# Patient Record
Sex: FEMALE | Race: WHITE | NOT HISPANIC OR LATINO | Employment: FULL TIME | URBAN - METROPOLITAN AREA
[De-identification: names, ages, dates, MRNs, and addresses within clinical notes are randomized per-mention and may not be internally consistent; named-entity substitution may affect disease eponyms.]

---

## 2024-01-16 ENCOUNTER — APPOINTMENT (OUTPATIENT)
Dept: AUDIOLOGY | Facility: CLINIC | Age: 29
End: 2024-01-16
Payer: COMMERCIAL

## 2024-01-22 DIAGNOSIS — M79.642 HAND PAIN, LEFT: Primary | ICD-10-CM

## 2024-01-23 ENCOUNTER — APPOINTMENT (OUTPATIENT)
Dept: AUDIOLOGY | Facility: CLINIC | Age: 29
End: 2024-01-23
Payer: COMMERCIAL

## 2024-01-23 ENCOUNTER — HOSPITAL ENCOUNTER (OUTPATIENT)
Dept: RADIOLOGY | Facility: CLINIC | Age: 29
Discharge: HOME | End: 2024-01-23
Payer: COMMERCIAL

## 2024-01-23 ENCOUNTER — OFFICE VISIT (OUTPATIENT)
Dept: ORTHOPEDIC SURGERY | Facility: CLINIC | Age: 29
End: 2024-01-23
Payer: COMMERCIAL

## 2024-01-23 ENCOUNTER — CLINICAL SUPPORT (OUTPATIENT)
Dept: AUDIOLOGY | Facility: CLINIC | Age: 29
End: 2024-01-23
Payer: COMMERCIAL

## 2024-01-23 VITALS — BODY MASS INDEX: 26.05 KG/M2 | WEIGHT: 147 LBS | HEIGHT: 63 IN

## 2024-01-23 DIAGNOSIS — M79.642 HAND PAIN, LEFT: ICD-10-CM

## 2024-01-23 DIAGNOSIS — H93.11 TINNITUS OF RIGHT EAR: ICD-10-CM

## 2024-01-23 DIAGNOSIS — Z02.6 ENCOUNTER RELATED TO WORKER'S COMPENSATION CLAIM: Primary | ICD-10-CM

## 2024-01-23 DIAGNOSIS — S60.222A: ICD-10-CM

## 2024-01-23 DIAGNOSIS — H90.11 CONDUCTIVE HEARING LOSS OF RIGHT EAR WITH UNRESTRICTED HEARING OF LEFT EAR: Primary | ICD-10-CM

## 2024-01-23 PROCEDURE — 73130 X-RAY EXAM OF HAND: CPT | Mod: LT

## 2024-01-23 PROCEDURE — 73130 X-RAY EXAM OF HAND: CPT | Mod: LEFT SIDE | Performed by: RADIOLOGY

## 2024-01-23 PROCEDURE — 99203 OFFICE O/P NEW LOW 30 MIN: CPT | Performed by: ORTHOPAEDIC SURGERY

## 2024-01-23 ASSESSMENT — PAIN DESCRIPTION - DESCRIPTORS: DESCRIPTORS: ACHING

## 2024-01-23 ASSESSMENT — PAIN - FUNCTIONAL ASSESSMENT: PAIN_FUNCTIONAL_ASSESSMENT: 0-10

## 2024-01-23 ASSESSMENT — PAIN SCALES - GENERAL: PAINLEVEL_OUTOF10: 5 - MODERATE PAIN
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
## 2024-01-23 NOTE — PROGRESS NOTES
HISTORY:  Harriet Washington, 28 yrs., was here for an annual audiologic assessment. 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 She reports her most recent surgery was in 2018. She has right conductive hearing loss previously noted. Her most recent audiogram was from 2/11/19. She feels her hearing fluctuates in her right ear and her left ear has remained stable. She notes constant right tinnitus following her most recent surgery. She denies otalgia, otorrea, aural fullness, dizziness, family history of hearing loss, and excessive noise exposure. She wears a hearing aid in the right ear obtained from a private practice in [place]. She is re-establishing care with Dr. Santiago for monitoring of her otologic health.       RESULTS:  Prior to testing both external auditory canals were clear. She has a surgical right ear and unremarkable left ear.     Immittance and acoustic reflexes:  Immittance testing yielded TYPE A tympanograms indicating normal middle ear function right ear  Immittance testing yielded TYPE Ad tympanograms indicating hyper compliant tympanic membrane movement left ear  Acoustic reflexes were absent right ear  Acoustic reflexes were present 500 - 4000 Hz left ear    Audiogram:  Mild sloping to severe conductive hearing loss in the right.   Normal hearing levels were obtained 250 - 8000 Hz in the left ear.  Speech reception thresholds obtained 45 dBHL in the right ear (w/ masking) and 15 dBHL in the left ear.   Speech discrimination scores were 100% at 85 dBHL (w/ masking) in the right ear and 92% at 55 dBHL in the left ear.    Distortion product otoacoustic emissions:  Emissions were absent 2000 - 8000 Hz in the right ear   Robust emissions were obtained 2000 -8000 Hz  in the left ear    IMPRESSIONS:  Normal middle ear function noted in the right ear and hyper compliant tympanic membrane movement in the left ear.  Normal acoustic reflexes in the left ear and  absent reflexes in the right ear.  Robust distortion product otoacoustic emissions indicate normal cochlear function in the left ear.   Mild sloping to severe conductive hearing loss in the right ear and normal hearing in the left ear.     RECOMMENDATIONS:  1.  Follow up with referring provider  2.  Retest hearing levels annually  3. Continue use of amplification in the right ear      time: 13:50 - 14:45    Report written by Willard Almonte, TANIA-A

## 2024-02-07 ENCOUNTER — APPOINTMENT (OUTPATIENT)
Dept: AUDIOLOGY | Facility: HOSPITAL | Age: 29
End: 2024-02-07
Payer: COMMERCIAL

## 2024-02-07 ENCOUNTER — APPOINTMENT (OUTPATIENT)
Dept: OTOLARYNGOLOGY | Facility: HOSPITAL | Age: 29
End: 2024-02-07
Payer: COMMERCIAL

## 2024-02-14 ENCOUNTER — HOSPITAL ENCOUNTER (OUTPATIENT)
Dept: RADIOLOGY | Facility: CLINIC | Age: 29
Discharge: HOME | End: 2024-02-14
Payer: COMMERCIAL

## 2024-02-14 DIAGNOSIS — Z02.6 ENCOUNTER RELATED TO WORKER'S COMPENSATION CLAIM: ICD-10-CM

## 2024-02-14 PROCEDURE — 73218 MRI UPPER EXTREMITY W/O DYE: CPT | Mod: LEFT SIDE | Performed by: STUDENT IN AN ORGANIZED HEALTH CARE EDUCATION/TRAINING PROGRAM

## 2024-02-14 PROCEDURE — 73218 MRI UPPER EXTREMITY W/O DYE: CPT | Mod: LT

## 2024-02-29 NOTE — PATIENT INSTRUCTIONS
Patient Education:  Cholesteatoma:  A cholesteatoma is a relatively uncommon benign condition that belongs to the category chronic ear condition. It is a “tumor-like” cyst that is formed by introduction of epithelial tissue (skin) into the middle ear space. This cyst can follow the path of least resistance and extend into the middle ear cleft, area above the middle ear (epitympanum) and area behind the middle ear (mastoid). As the condition progresses it causes progressive deterioration of hearing, recurrent infection manifested by drainage, and when complications occur dizziness, facial palsy and rarely intracranial complications. The condition has a high rate of recidivism (recurrence or persistence) given the nature of the pathology and complexity of the ear anatomy. The treatment is usually surgical designed first to achieve a safe ear (that does not have cholesteatoma) which may require more than one surgery. Often a second stage procedure is planned in 6 to 9 months to ensure absence of recurrence. The secondary goal of the surgery is to restore hearing functionality by rebuilding the hearing bones. This requires at times placement of a middle ear prosthesis and is often done at the second stage procedure. Rate of hearing restoration depend on the status of the residual hearing bones and the healing process of the ear. Good hearing restoration ranges from 65 to 90%. In the event hearing is not adequately restore the patient has the option of obtaining hearing aids. The surgery advised is known as tympanoplasty and mastoidectomy. The surgeon may detail the types (canal wall down or intact canal wall up) that he or she feels is most appropriate to treat your condition.  Tympanoplasty  Tympanoplasty or reconstruction of the middle ear hearing mechanism serves the purpose of rebuilding the tympanic membrane and/or middle ear bones. Success depends almost as much on the ability of the body to heal and preserve the  reconstruction as it does on the surgeon's skill.   Adverse effects are uncommon, but loss of sense of taste on the side of the tongue may occur in 10 to 20%. It is usually only a minor inconvenience for a few weeks. Post-operative dizziness, usually limited to few days, is somewhat uncommon after surgery limited to the repair of a tympanic membrane perforation and slightly more common after rebuilding the ear bones. Unless control of infection or concern of cholesteatoma (as skin in the middle ear exists) is the reason for surgery, tympanoplasty is an elective procedure. Use of a hearing aid may be an alternative to reconstructive surgery. If the tympanic membrane perforation is not repaired, ear plugs are recommended to protect the middle ear from contamination when bathing. This may help to prevent infection and its complications. Some underlying chronic condition such as Eustachian tube dilatory dysfunction may affect the long term results after a successful initial surgery. Your physician may discuss this with you during subsequent visits.  Mastoidectomy:  Mastoidectomy is an operation to remove disease from the bone behind the ear, when medical management is inadequate. Sometimes a mastoidectomy is required in order to gain better exposure to the disease. Although complications do not often occur, they include persistent ear drainage, infection in the mastoid cavity, and hearing loss. Weakness of the face on the side of the surgery is a rare but potential hazard in mastoid surgery. There may be dizziness for a short time after surgery, but it is rarely permanent. Loss of taste on the side of the tongue may occur and last a few weeks, but may be permanent.

## 2024-02-29 NOTE — PROGRESS NOTES
History of present illness:  Harriet Washington is a 28 y.o. female presenting today to [facility]. She has a history of right ear cholesteatoma s/p two tympanomastoidectomies. Patient reports she gets recurrent ear infections which resolved with topical drops. She notes her hearing is unchanged, fluctuates during the day.       RECALL 02/11/2019  Here for follow up s/p right staged post auricular canal wall intact tympanomastoidectomy for removal of cholesteatoma with silastic sheet placement in middle ear on 9/28/18. She is overall stable since her last visit. Hearing is similar to before surgery. No new otologic symptoms.     RECALL 10/22/2018  Ms. Washington is a 23 year old female presenting in clinic today for her first post operative visit after a right staged post auricular canal wall intact tympanomastoidectomy for removal of cholesteatoma with silastic sheet placement in middle ear on 9/28/18. Patient had significant pain post operatively and had to miss 8 days of work for recovery. Patient had minimal drainage after surgery but does feel her hearing is decreased and also hears static in her ear frequently. Pathology from surgery confirmed the pre operative diagnosis of a cholesteatoma. Patient does not have any significant otologic concerns today.     RECALL 09/24/2018  Ms. Washington is a 22 year old female presenting in clinic today for a follow-up visit to evaluate right sided chronic otitis media and review of CT IAC. She has had recurrent infections in the R ear. She has a history of right sided transcanal atticotomy for removal of cholesteatoma by Dr. Narayanan in 2014.      She reports right sided aural fullness and yellow drainage for the past month. This is associated with some pain.    RECALL 04/23/2018  Ms. Washington is a 22 year old female presenting in clinic today for a follow-up visit to evaluate right sided chronic otitis media.     The patient's current medications, active allergies and  list of medical problems were reviewed in the EHR and confirmed electronically there are as follows;  Allergies:   No Known Allergies  Current list of medications:   No current outpatient medications on file.     No current facility-administered medications for this visit.     Problem list:  There is no problem list on file for this patient.        Physical Examination:  CONSTITUTIONAL:  No acute distress  VOICE:  No hoarseness or other abnormality  RESPIRATION:  Breathing comfortably, no stridor  CV:  No clubbing/cyanosis/edema in hands  EYES:  EOM intact, sclera clear  NEURO:  Alert and oriented times 3, Cranial nerves II-XII grossly intact and symmetric bilaterally  HEAD AND FACE:  Symmetric facial features, no masses or lesions  RIGHT EAR: graft intact, cartilage in good placement, one in the attic and one in the posterior part of the canal. No evidence of recurrent cholesteatoma, no significant TM retraction.   LEFT EAR: Normal external ear and post auricular area, no visible lesions, external auditory canal patent, tympanic membrane intact, no retraction, no signs of mass, effusion, or infection within the middle ear  NOSE:  Anterior rhinoscopy clear, no significant external deformity.  ORAL CAVITY/OROPHARYNX/LIPS:  normal lining, mobile tongue.  PHARYNGEAL WALLS: Moist mucosal lining, good palatal elevation  NECK/LYMPH:  No LAD, no thyroid masses, trachea midline  SKIN:  Neck and facial skin is without scar or injury  PSYCH:  Alert and oriented with appropriate mood and affect    Diagnostic testing:    Audiometry:  Audiometry testing done reveals:  On the right: moderate conductive hearing loss  On the left: normal hearing sensitivity      I personally reviewed the available patient's external record and independently reviewed their audiometric testing [and] radiographic imaging through the appropriate viewing software as detailed in my note and agree with the detailed report.      Impression:  Right  COM  Right conductive Hearing Loss  History of Right cholesteatoma s/p right tympanomastoidectomy for removal of cholesteatoma with silastic sheet placement in middle ear       Recommendation:  Overall, she is stable. She is re-establishing care. I have not seen her in a few years. No active otologic issues at this point. Patient reassure. She will like to come back to see me in a year for examination.      Scribe Attestation  By signing my name below, IAmanda Scribe   attest that this documentation has been prepared under the direction and in the presence of Salinas Santiago MD.

## 2024-03-01 ENCOUNTER — OFFICE VISIT (OUTPATIENT)
Dept: OTOLARYNGOLOGY | Facility: CLINIC | Age: 29
End: 2024-03-01
Payer: COMMERCIAL

## 2024-03-01 VITALS — BODY MASS INDEX: 28.32 KG/M2 | WEIGHT: 170 LBS | HEIGHT: 65 IN

## 2024-03-01 DIAGNOSIS — H90.11 CONDUCTIVE HEARING LOSS OF RIGHT EAR WITH UNRESTRICTED HEARING OF LEFT EAR: Primary | ICD-10-CM

## 2024-03-01 PROCEDURE — 99203 OFFICE O/P NEW LOW 30 MIN: CPT | Performed by: OTOLARYNGOLOGY

## 2024-03-01 PROCEDURE — 1036F TOBACCO NON-USER: CPT | Performed by: OTOLARYNGOLOGY

## 2024-03-01 ASSESSMENT — PATIENT HEALTH QUESTIONNAIRE - PHQ9
1. LITTLE INTEREST OR PLEASURE IN DOING THINGS: NOT AT ALL
SUM OF ALL RESPONSES TO PHQ9 QUESTIONS 1 AND 2: 0
2. FEELING DOWN, DEPRESSED OR HOPELESS: NOT AT ALL

## 2024-04-01 ENCOUNTER — OFFICE VISIT (OUTPATIENT)
Dept: ORTHOPEDIC SURGERY | Facility: CLINIC | Age: 29
End: 2024-04-01
Payer: COMMERCIAL

## 2024-04-01 VITALS — HEIGHT: 65 IN | BODY MASS INDEX: 28.32 KG/M2 | WEIGHT: 170 LBS

## 2024-04-01 DIAGNOSIS — Z02.6 ENCOUNTER RELATED TO WORKER'S COMPENSATION CLAIM: Primary | ICD-10-CM

## 2024-04-01 PROCEDURE — 99214 OFFICE O/P EST MOD 30 MIN: CPT | Performed by: ORTHOPAEDIC SURGERY

## 2024-04-01 ASSESSMENT — PAIN - FUNCTIONAL ASSESSMENT: PAIN_FUNCTIONAL_ASSESSMENT: NO/DENIES PAIN
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
## 2024-05-29 DIAGNOSIS — M24.542 CONTRACTURE OF THUMB JOINT, LEFT: ICD-10-CM

## 2024-06-12 ENCOUNTER — ANESTHESIA EVENT (OUTPATIENT)
Dept: OPERATING ROOM | Facility: HOSPITAL | Age: 29
End: 2024-06-12
Payer: COMMERCIAL

## 2024-06-13 ENCOUNTER — ANESTHESIA (OUTPATIENT)
Dept: OPERATING ROOM | Facility: HOSPITAL | Age: 29
End: 2024-06-13
Payer: COMMERCIAL

## 2024-06-13 ENCOUNTER — HOSPITAL ENCOUNTER (OUTPATIENT)
Facility: HOSPITAL | Age: 29
Setting detail: OUTPATIENT SURGERY
Discharge: HOME | End: 2024-06-13
Attending: ORTHOPAEDIC SURGERY | Admitting: ORTHOPAEDIC SURGERY
Payer: COMMERCIAL

## 2024-06-13 VITALS
TEMPERATURE: 97.3 F | OXYGEN SATURATION: 100 % | BODY MASS INDEX: 27.31 KG/M2 | WEIGHT: 160 LBS | SYSTOLIC BLOOD PRESSURE: 103 MMHG | HEART RATE: 69 BPM | DIASTOLIC BLOOD PRESSURE: 57 MMHG | HEIGHT: 64 IN | RESPIRATION RATE: 17 BRPM

## 2024-06-13 DIAGNOSIS — M24.542 CONTRACTURE OF THUMB JOINT, LEFT: Primary | ICD-10-CM

## 2024-06-13 PROBLEM — H91.90 HEARING LOSS: Status: ACTIVE | Noted: 2024-06-13

## 2024-06-13 LAB — PREGNANCY TEST URINE, POC: NEGATIVE

## 2024-06-13 PROCEDURE — 3600000003 HC OR TIME - INITIAL BASE CHARGE - PROCEDURE LEVEL THREE: Performed by: ORTHOPAEDIC SURGERY

## 2024-06-13 PROCEDURE — 7100000001 HC RECOVERY ROOM TIME - INITIAL BASE CHARGE: Performed by: ORTHOPAEDIC SURGERY

## 2024-06-13 PROCEDURE — 2500000004 HC RX 250 GENERAL PHARMACY W/ HCPCS (ALT 636 FOR OP/ED): Performed by: REGISTERED NURSE

## 2024-06-13 PROCEDURE — 2500000004 HC RX 250 GENERAL PHARMACY W/ HCPCS (ALT 636 FOR OP/ED): Performed by: ANESTHESIOLOGY

## 2024-06-13 PROCEDURE — 81025 URINE PREGNANCY TEST: CPT | Performed by: ANESTHESIOLOGY

## 2024-06-13 PROCEDURE — 7100000002 HC RECOVERY ROOM TIME - EACH INCREMENTAL 1 MINUTE: Performed by: ORTHOPAEDIC SURGERY

## 2024-06-13 PROCEDURE — 3700000002 HC GENERAL ANESTHESIA TIME - EACH INCREMENTAL 1 MINUTE: Performed by: ORTHOPAEDIC SURGERY

## 2024-06-13 PROCEDURE — 7100000009 HC PHASE TWO TIME - INITIAL BASE CHARGE: Performed by: ORTHOPAEDIC SURGERY

## 2024-06-13 PROCEDURE — 26520 RELEASE KNUCKLE CONTRACTURE: CPT | Performed by: ORTHOPAEDIC SURGERY

## 2024-06-13 PROCEDURE — 2500000005 HC RX 250 GENERAL PHARMACY W/O HCPCS: Performed by: REGISTERED NURSE

## 2024-06-13 PROCEDURE — 3600000008 HC OR TIME - EACH INCREMENTAL 1 MINUTE - PROCEDURE LEVEL THREE: Performed by: ORTHOPAEDIC SURGERY

## 2024-06-13 PROCEDURE — 2500000004 HC RX 250 GENERAL PHARMACY W/ HCPCS (ALT 636 FOR OP/ED): Mod: JZ | Performed by: ORTHOPAEDIC SURGERY

## 2024-06-13 PROCEDURE — 7100000010 HC PHASE TWO TIME - EACH INCREMENTAL 1 MINUTE: Performed by: ORTHOPAEDIC SURGERY

## 2024-06-13 PROCEDURE — 2500000001 HC RX 250 WO HCPCS SELF ADMINISTERED DRUGS (ALT 637 FOR MEDICARE OP): Performed by: ANESTHESIOLOGY

## 2024-06-13 PROCEDURE — 3700000001 HC GENERAL ANESTHESIA TIME - INITIAL BASE CHARGE: Performed by: ORTHOPAEDIC SURGERY

## 2024-06-13 RX ORDER — FENTANYL CITRATE 50 UG/ML
INJECTION, SOLUTION INTRAMUSCULAR; INTRAVENOUS AS NEEDED
Status: DISCONTINUED | OUTPATIENT
Start: 2024-06-13 | End: 2024-06-13

## 2024-06-13 RX ORDER — ONDANSETRON HYDROCHLORIDE 2 MG/ML
INJECTION, SOLUTION INTRAVENOUS AS NEEDED
Status: DISCONTINUED | OUTPATIENT
Start: 2024-06-13 | End: 2024-06-13

## 2024-06-13 RX ORDER — MIDAZOLAM HYDROCHLORIDE 1 MG/ML
INJECTION INTRAMUSCULAR; INTRAVENOUS AS NEEDED
Status: DISCONTINUED | OUTPATIENT
Start: 2024-06-13 | End: 2024-06-13

## 2024-06-13 RX ORDER — SODIUM CHLORIDE, SODIUM LACTATE, POTASSIUM CHLORIDE, CALCIUM CHLORIDE 600; 310; 30; 20 MG/100ML; MG/100ML; MG/100ML; MG/100ML
100 INJECTION, SOLUTION INTRAVENOUS CONTINUOUS
Status: DISCONTINUED | OUTPATIENT
Start: 2024-06-13 | End: 2024-06-13 | Stop reason: HOSPADM

## 2024-06-13 RX ORDER — ONDANSETRON HYDROCHLORIDE 2 MG/ML
4 INJECTION, SOLUTION INTRAVENOUS ONCE AS NEEDED
Status: DISCONTINUED | OUTPATIENT
Start: 2024-06-13 | End: 2024-06-13 | Stop reason: HOSPADM

## 2024-06-13 RX ORDER — PHENYLEPHRINE HCL IN 0.9% NACL 1 MG/10 ML
SYRINGE (ML) INTRAVENOUS AS NEEDED
Status: DISCONTINUED | OUTPATIENT
Start: 2024-06-13 | End: 2024-06-13

## 2024-06-13 RX ORDER — OXYCODONE HYDROCHLORIDE 5 MG/1
5 TABLET ORAL EVERY 4 HOURS PRN
Status: DISCONTINUED | OUTPATIENT
Start: 2024-06-13 | End: 2024-06-13 | Stop reason: HOSPADM

## 2024-06-13 RX ORDER — KETOROLAC TROMETHAMINE 30 MG/ML
INJECTION, SOLUTION INTRAMUSCULAR; INTRAVENOUS AS NEEDED
Status: DISCONTINUED | OUTPATIENT
Start: 2024-06-13 | End: 2024-06-13

## 2024-06-13 RX ORDER — CEFAZOLIN 1 G/1
INJECTION, POWDER, FOR SOLUTION INTRAVENOUS AS NEEDED
Status: DISCONTINUED | OUTPATIENT
Start: 2024-06-13 | End: 2024-06-13

## 2024-06-13 RX ORDER — OXYCODONE HYDROCHLORIDE 5 MG/1
5 TABLET ORAL EVERY 6 HOURS PRN
Qty: 28 TABLET | Refills: 0 | Status: SHIPPED | OUTPATIENT
Start: 2024-06-13 | End: 2024-06-20

## 2024-06-13 RX ORDER — HYDRALAZINE HYDROCHLORIDE 20 MG/ML
5 INJECTION INTRAMUSCULAR; INTRAVENOUS EVERY 30 MIN PRN
Status: DISCONTINUED | OUTPATIENT
Start: 2024-06-13 | End: 2024-06-13 | Stop reason: HOSPADM

## 2024-06-13 RX ORDER — BUPIVACAINE HYDROCHLORIDE 5 MG/ML
INJECTION, SOLUTION EPIDURAL; INTRACAUDAL AS NEEDED
Status: DISCONTINUED | OUTPATIENT
Start: 2024-06-13 | End: 2024-06-13 | Stop reason: HOSPADM

## 2024-06-13 RX ORDER — LIDOCAINE HYDROCHLORIDE 20 MG/ML
INJECTION, SOLUTION EPIDURAL; INFILTRATION; INTRACAUDAL; PERINEURAL AS NEEDED
Status: DISCONTINUED | OUTPATIENT
Start: 2024-06-13 | End: 2024-06-13

## 2024-06-13 RX ORDER — METHYLPREDNISOLONE 4 MG/1
TABLET ORAL
Qty: 21 TABLET | Refills: 0 | Status: SHIPPED | OUTPATIENT
Start: 2024-06-13 | End: 2024-06-20

## 2024-06-13 RX ORDER — PROPOFOL 10 MG/ML
INJECTION, EMULSION INTRAVENOUS AS NEEDED
Status: DISCONTINUED | OUTPATIENT
Start: 2024-06-13 | End: 2024-06-13

## 2024-06-13 RX ORDER — DEXMEDETOMIDINE HYDROCHLORIDE 100 UG/ML
INJECTION, SOLUTION INTRAVENOUS AS NEEDED
Status: DISCONTINUED | OUTPATIENT
Start: 2024-06-13 | End: 2024-06-13

## 2024-06-13 SDOH — HEALTH STABILITY: MENTAL HEALTH: CURRENT SMOKER: 0

## 2024-06-13 ASSESSMENT — PAIN - FUNCTIONAL ASSESSMENT
PAIN_FUNCTIONAL_ASSESSMENT: 0-10

## 2024-06-13 ASSESSMENT — ENCOUNTER SYMPTOMS
LOSS OF SENSATION IN FEET: 0
OCCASIONAL FEELINGS OF UNSTEADINESS: 0
DEPRESSION: 0

## 2024-06-13 ASSESSMENT — PAIN SCALES - GENERAL
PAINLEVEL_OUTOF10: 0 - NO PAIN
PAINLEVEL_OUTOF10: 1
PAINLEVEL_OUTOF10: 1
PAINLEVEL_OUTOF10: 0 - NO PAIN
PAINLEVEL_OUTOF10: 1
PAINLEVEL_OUTOF10: 1
PAINLEVEL_OUTOF10: 0 - NO PAIN

## 2024-06-13 ASSESSMENT — COLUMBIA-SUICIDE SEVERITY RATING SCALE - C-SSRS
2. HAVE YOU ACTUALLY HAD ANY THOUGHTS OF KILLING YOURSELF?: NO
6. HAVE YOU EVER DONE ANYTHING, STARTED TO DO ANYTHING, OR PREPARED TO DO ANYTHING TO END YOUR LIFE?: NO
1. IN THE PAST MONTH, HAVE YOU WISHED YOU WERE DEAD OR WISHED YOU COULD GO TO SLEEP AND NOT WAKE UP?: NO

## 2024-06-13 ASSESSMENT — PAIN DESCRIPTION - LOCATION: LOCATION: HAND

## 2024-06-13 ASSESSMENT — PAIN DESCRIPTION - ORIENTATION: ORIENTATION: LEFT

## 2024-06-13 NOTE — ANESTHESIA POSTPROCEDURE EVALUATION
Patient: Harriet Washington    Procedure Summary       Date: 06/13/24 Room / Location: [facility] OR 04 / Virtual [facility]    Anesthesia Start: 1437 Anesthesia Stop: 1542    Procedure: Left Thumb MP Joint Capsulectomy (Left: Thumb) Diagnosis:       Contracture of thumb joint, left      (Contracture of thumb joint, left [M24.542])    Surgeons: Juan Kaminski MD Responsible Provider: Magali Evans MD    Anesthesia Type: MAC ASA Status: 1            Anesthesia Type: MAC    Vitals Value Taken Time   /57 06/13/24 1635   Temp 36.3 °C (97.3 °F) 06/13/24 1635   Pulse 69 06/13/24 1635   Resp 17 06/13/24 1635   SpO2 100 % 06/13/24 1635       Anesthesia Post Evaluation    Patient location during evaluation: PACU  Patient participation: complete - patient participated  Level of consciousness: awake and alert  Pain management: adequate  Airway patency: patent  Cardiovascular status: stable  Respiratory status: spontaneous ventilation  Hydration status: acceptable  Postoperative Nausea and Vomiting: none        No notable events documented.

## 2024-06-13 NOTE — DISCHARGE INSTRUCTIONS
Post-Operative Instructions  Dr. Juan Kaminski [phone]    Dressing:  You have a bandage or splint covering your operative site.    Do not remove the dressing until your next scheduled appointment with your surgeon or therapist. Keep your dressing clean and dry. The dressing will be removed at that appointment.     Showering:  You may shower following surgery but please adhere to above instructions regarding the dressing. If showering with bandage/splint in place please ensure that it is kept dry and covered while bathing. There are commercially available cast bags that can be used to protect your dressing while showering. If using garbage bags please make sure that there are no holes in the bag and that the bag has been sealed above the dressing. If the bandage gets wet you must contact your surgeon's office to make arrangements to be seen to have the bandage changed.     Ice/Elevation:  The application of ice to your surgical site after surgery will help with pain control and swelling. This can be very effective for 48-72 hours after surgery. Please be careful to avoid getting bandage wet from a leaky ice bag. Please be advised that the ice may need to be applied for longer periods of time for the cooling effect to penetrate the post-operative dressing.     Elevation of the operative site above the level of the heart as much as possible for the first 48-72 hours after surgery. Use your sling if you have been provided one while standing or walking. If your fingers are not included in the dressing you are encouraged to attempt finger range of motion as this will help with your hand swelling and ultimate recovery.    Pain Medication:  If you received a regional anesthetic on the day of your surgery your arm and hand may be numb for up to 24 hours after surgery. It is important to wear your sling while the block is still effective in order to protect your arm. It is advisable to take pain medications prior to  going to sleep in case the regional anesthesia medication wears off while you are sleeping.    Take your pain medications as directed. Narcotic pain medications can cause lethargy, nausea and constipation. Please contact your surgeon's office and discontinue the medication if these symptoms become problematic. Eating a regular diet, drinking fluids and walking can help with constipation from these medications. Avoid alcohol consumption and driving while taking narcotic pain medications.     Additional pain control options:     You are encouraged to take over the counter medications like Advil / Motrin / Ibuprofen / Aleve in addition to your prescribed pain medications after surgery.    Smoking/Tobacco:  Tobacco use is known to interfere with wound and fracture healing and increase post-operative pain. It can also increase your risk of poor outcomes following surgery. Please do not use tobacco or nicotine products following your surgery. This includes smokeless tobacco, or nicotine replacement products (patches, gum, etc.).    Driving:  It is not advisable to operate a vehicle while using narcotic pain medications. Additionally, please be advised that you are likely to have challenges operating a car or motorcycle while you are still in your postoperative dressing and this could increase your risk of being involved in an accident and being cited for driving while physically impaired.     Warning Signs:  Observe your arm/hand and incision site (if visible) for increased redness, inflammation, drainage, odor or pain that is unrelieved by rest, elevation or medication. Please contact your surgeon's office immediately if you develop any of these issues or if you develop a fever greater than 101°.    Follow Up Appointments:  Your post-operative appointment has been scheduled for 6/24/24 at 4:15 pm with Allyn Nash    [facility], [address] [place] [address] Rd., [place], [place], [address]

## 2024-06-13 NOTE — H&P
History Of Present Illness  Harriet Washington is a 29 y.o. 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     Past Medical History  Past Medical History:   Diagnosis Date    Acute nasopharyngitis (common cold) 12/14/2015    Acute rhinitis    Encounter for examination of ears and hearing without abnormal findings 03/27/2014    Evaluation of hearing impairment    Impacted cerumen, right ear 04/23/2018    Impacted cerumen of right ear    Otalgia, right ear 04/20/2016    Otalgia, right ear    Other acute sinusitis 11/17/2015    Other acute sinusitis    Other conditions influencing health status     Past Medical History Reported By Patient    Other specified soft tissue disorders     Foot swelling    Personal history of other diseases of the nervous system and sense organs 05/24/2017    History of acute otitis externa    Personal history of other diseases of the nervous system and sense organs 11/05/2013    History of impacted cerumen    Personal history of other diseases of the respiratory system 05/06/2015    History of acute bronchitis    Personal history of other diseases of the respiratory system 12/14/2015    History of acute sinusitis    Personal history of other diseases of the respiratory system 11/17/2015    History of pharyngitis    Unspecified abnormal findings in urine     Abnormal urinalysis       Surgical History  Past Surgical History:   Procedure Laterality Date    TYMPANOSTOMY TUBE PLACEMENT  11/05/2013    Ear Pressure Equalization Tube, Insertion, Bilaterally        Social History  She reports that she has never smoked. She has never used smokeless tobacco. She reports current alcohol use. She reports that she does not use drugs.    Family History  No family history on file.     Allergies  Patient has no known allergies.    Review of Systems   All other systems reviewed and are negative.       Physical Exam  Physical Examination Findings:  Constitutional: Appears  "well-developed and well-nourished.  Head: Normocephalic and atraumatic.  Eyes: Pupils are equal and round.  Cardiovascular: Intact distal pulses.   Respiratory: Effort normal. No respiratory distress.  Neurologic: Alert and oriented to person, place, and time.  Skin: Skin is warm and dry.  Hematologic / Lympahtic: No lymphedema, lymphangitis.  Psychiatric: normal mood and affect. Behavior is normal.   Musculoskeletal: Left hand with thumb MP joint extension contracture  Last Recorded Vitals  Blood pressure 131/74, pulse 79, temperature 36.7 °C (98.1 °F), temperature source Temporal, resp. rate 16, height 1.626 m (5' 4\"), weight 72.6 kg (160 lb), last menstrual period 06/13/2024, SpO2 96%.       Assessment/Plan   Principal Problem:    Contracture of thumb joint, left      Will proceed with left thumb MP joint contracture release as scheduled       Juan Kaminski MD    "

## 2024-06-13 NOTE — PERIOPERATIVE NURSING NOTE
1538 - Patient arrived to [place] after procedure with Anesthesia and procedure RN, procedure discussed, plan reviewed. Pt hypotensive on arrival to PACU. MD made aware at this time.     1545 - patient to received 500 mL fluid bolus at this time per MD.     1554 - patient mother updated via telephone at this time.     1615 - patient tolerating apple juice and goldfish at this time.     1618 - patient medicated per emr order at this time.     1629 - discharge approval received by MD at this time.     1634 - phase I care complete. Pt transported to phase II in stable condition.

## 2024-06-13 NOTE — ANESTHESIA PREPROCEDURE EVALUATION
"  Patient: Harriet Washington    Procedure Information       Date/Time: 06/13/24 1400    Procedure: Left Thumb MP Joint Capsulectomy (Left: Thumb)    Location: [facility] A OR 04 / Virtual [facility] A OR    Surgeons: Juan Kaminski MD                                                           Pre-Anesthesia Evaluation      Harriet Washington is a 29 y.o. healthy female who presents for procedure stated above.     Past Surgical History:   Procedure Laterality Date    TYMPANOSTOMY TUBE PLACEMENT  11/05/2013    Ear Pressure Equalization Tube, Insertion, Bilaterally     Social History reviewed  She reports that she has never smoked. She has never used smokeless tobacco. She reports current alcohol use. She reports that she does not use drugs.  Allergies reviewed  No Known Allergies  No current outpatient medications  Visit Vitals  /74   Pulse 79   Temp 36.7 °C (98.1 °F) (Temporal)   Resp 16   Ht 1.626 m (5' 4\")   Wt 72.6 kg (160 lb)   LMP 06/13/2024   SpO2 96%   BMI 27.46 kg/m²   OB Status Having periods   Smoking Status Never   BSA 1.81 m²       Relevant Problems   Anesthesia (within normal limits)      HEENT   (+) Hearing loss       Clinical information reviewed:   Tobacco  Allergies  Meds  Problems  Med Hx  Surg Hx  OB Status    Fam Hx  Soc Hx        NPO Detail:  NPO/Void Status  Carbohydrate Drink Given Prior to Surgery? : N  Date of Last Liquid: 06/13/24  Time of Last Liquid: 1015  Date of Last Solid: 06/12/24  Time of Last Solid: 2200  Last Intake Type: Clear fluids  Time of Last Void: 1220         Physical Exam    Airway  Mallampati: I  TM distance: >3 FB  Neck ROM: full     Cardiovascular   Rhythm: regular  Rate: normal     Dental - normal exam     Pulmonary   Comments: Normal RR and effort   Abdominal            Anesthesia Plan    History of general anesthesia?: yes  History of complications of general anesthesia?: no    ASA 1     MAC     The patient is not a current smoker.    intravenous induction   Anesthetic " plan and risks discussed with patient.    Plan discussed with CRNA.

## 2024-06-13 NOTE — OP NOTE
Left Thumb MP Joint Capsulectomy (L) Operative Note     Date: 2024  OR Location: [facility] A OR    Name: Harriet Washington, : 1995, Age: 29 y.o., MRN: [id number], Sex: female    Diagnosis  Pre-op Diagnosis     * Contracture of thumb joint, left [M24.542] Post-op Diagnosis     * Contracture of thumb joint, left [M24.542]     Procedures  Left Thumb MP Joint Capsulectomy  40948 - DE CAPSULECTOMY/CAPSULOTOMY MTCARPHLNGL JOINT EACH      Surgeons      * Juan Kaminski - Primary    Resident/Fellow/Other Assistant:  Surgeons and Role:     * Steven Thakkar MD - Resident - Assisting    Procedure Summary  Anesthesia: Monitor Anesthesia Care  ASA: I  Anesthesia Staff: Anesthesiologist: Magali Evans MD  CRNA: LULU Antonio-CRNA, DNAP  Estimated Blood Loss: 1 mL  Intra-op Medications:   Administrations occurring from 1400 to 1515 on 24:   Medication Name Total Dose   lactated Ringer's infusion Cannot be calculated              Anesthesia Record               Intraprocedure I/O Totals          Intake    Propofol Drip 0.00 mL    The total shown is the total volume documented since Anesthesia Start was filed.    lactated Ringer's infusion 600.00 mL    Total Intake 600 mL       Output    Urine 0 mL    Est. Blood Loss 2 mL    Total Output 2 mL       Net    Net Volume 598 mL          Specimen: No specimens collected     Staff:   Circulator: Betty  Circulator: Katie Maynard Person: Aury Garcia Scrub: Tata         Drains and/or Catheters: * None in log *    Tourniquet Times:     Total Tourniquet Time Documented:  Arm - Lower (Right) - 35 minutes  Total: Arm - Lower (Right) - 35 minutes      Implants:     Findings: Thumb MP joint extension contracture with volar and dorsal capsular contractures    Indications: Harriet Washington is an 29 y.o. female who is having surgery for Contracture of thumb joint, left [M24.542].      The patient was seen in the preoperative area. The risks, benefits, complications,  treatment options, non-operative alternatives, expected recovery and outcomes were discussed with the patient. The possibilities of reaction to medication, pulmonary aspiration, injury to surrounding structures, bleeding, recurrent infection, the need for additional procedures, failure to diagnose a condition, and creating a complication requiring transfusion or operation were discussed with the patient. The patient concurred with the proposed plan, giving informed consent.  The site of surgery was properly noted/marked if necessary per policy. The patient has been actively warmed in preoperative area. Preoperative antibiotics have been ordered and given within 1 hours of incision. Venous thrombosis prophylaxis have been ordered including bilateral sequential compression devices    Procedure Details:   29-year-old female who developed a left thumb rigid MP joint extension contracture following a blunt injury to the thumb at work last November.  She has failed to respond to conservative treatment measures and presents now for a left thumb MP joint capsulectomy.  Preoperatively left hand identified and marked for surgery.  Informed consent process was completed.    Patient was brought to the operating room placed supine on the operating table.  A timeout procedure was performed to verify the correct patient, procedure and operative site.  Intravenous antibiotics were administered.  After appropriate level of IV sedation and been achieved local anesthetic was infiltrated around the base of the thumb circumferentially.  Left upper extremity was then prepped and draped usual sterile fashion.  Limb was exsanguinated and a proximal forearm tourniquet was inflated to 250 mmHg.    We made a radial mid axial incision extending from the midportion of the metacarpal shaft out past the MP joint to the proximal phalanx.  We dissected down through the subcutaneous tissues.  We first mobilized the dorsal flap.  The dorsal radial  sensory branch of the thumb was identified mobilized and protected.  We identified the extensor pollicis brevis and extensor pollicis longus tendons which had totally normal appearance without significant scarring.  We then identified the abductor aponeurosis which was very thickened.  We incised the abductor aponeurosis just volar to the EPB tendon allowing us to mobilize the extensor medellin dorsally and expose the dorsal MP joint capsule.  This had a slightly thickened appearance.  Dorsal capsulectomy was performed and then with gentle manipulation we are able to demonstrate improved flexion but this appeared to be more hinging as opposed to true gliding into flexion.  This suggested that there was something volarly that was blocking the proximal phalanx from sliding into a flexed position.    We mobilized the abductor pollicis brevis volarly to expose the radial collateral ligament and the volar plate.  We left the proper collateral ligament intact but released the accessory collateral ligament onto the volar plate and then made an incision onto the volar plate directly onto the radial sesamoid.  This was very scarred without any evidence that the sesamoid over the volar plate volar capsule was mobilized.  We first excised the radial sesamoid.  We still had more hinging than pure flexion.  The ulnar sesamoid was then excised with a volar plate.  Now we are able to bring the joint smoothly into full flexion.  The radial and ulnar collateral ligaments remained intact and stable.  The wound was copiously irrigated.  We repaired to the abductor aponeurosis.  The wound was closed interrupted fashion.  Sterile bandages were applied.  Tourniquet was deflated.  Patient was placed into a well-padded short arm thumb spica splint holding the MP joint into position at about 20 degrees of flexion.  She was awoken from her anesthetic and transferred to recovery in stable condition.    Postoperatively she will be discharged home  once comfortable.  She will remain in her splint until she sees her therapist in 4 days.  At that time she will be transition into a removable hand-based thumb spica splint and allowed to begin active and active assist thumb MP joint flexion.  Working to hold off on any passive mobilization of the thumb at this point and on any resisted use of the left thumb.  She will return to see me in 2 weeks for wound check.        Complications:  None; patient tolerated the procedure well.    Disposition: PACU - hemodynamically stable.  Condition: stable         Additional Details:      Attending Attestation: I was present and scrubbed for the entire procedure.    Juan Kaminski  Phone Number: [phone]

## 2024-06-17 ENCOUNTER — EVALUATION (OUTPATIENT)
Dept: OCCUPATIONAL THERAPY | Facility: CLINIC | Age: 29
End: 2024-06-17
Payer: COMMERCIAL

## 2024-06-17 DIAGNOSIS — M24.542 CONTRACTURE OF THUMB JOINT, LEFT: Primary | ICD-10-CM

## 2024-06-17 PROCEDURE — L3913 HFO W/O JOINTS CF: HCPCS | Performed by: OCCUPATIONAL THERAPIST

## 2024-06-17 NOTE — PROGRESS NOTES
"  Occupational Therapy  Occupational Therapy Orthopedic Evaluation    Patient Name: Harriet Washington  MRN: [id number]  Today's Date: 6/17/2024       Insurance:  Visit number: 1 of 12  Authorization info: required  Medicare Certification Period  -  Start: 4/1/24  End: 8/2/24    Current Problem  1. Contracture of thumb joint, left          Referred by:  Dr. Kaminski   Reason for visit:  L thumb Mp joint contracture release    Subjective   Chief Complaint: Pt presents as walk-in patient for orthosis fabrication following first post operative visit with Dr. Kaminski.  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 Yes  Work Related Injury? Yes    Hand dominance: Right  Effected extremity: Left    Medical History Form: Reviewed (scanned into chart)  Prior Level of Function (PLOF)  Patient previously independent with all ADLs/IADLs    ADL/IADL Deficits:  Bathing, Dressing, Feeding, Hygiene/Grooming, Hair care, Home mgt, Meal prep, and Work      Precautions:   NWB      Pain:   1-2/10  Location: L thumb MP  Description: sharp pain dorsal MP and aching volar MP tingling    Patients Living Environment: Reviewed and no concern  Lives with: alone - Mother lives 2 minutes away available for assistance  Primary Language: English  Patient's Goal(s) for Therapy:   \"regain movement and strength of my thumb joint\"      Objective     LEFT HAND   (ext/flex)   MCP PIP DIP DPC   IF     WNL   MF    WNL   RF    WNL   SF    WNL   Thumb WNL  15 (A)        Wound:  - steri strips intact    Edema:  -  min    Sensation:  - intact light touch  - hypersensitivity  - paraesthesias    Performance Impacted:   - ROM   - Joint mobility   - Strength   - Sensibility   - Pain   - Scar    Outcome Measures:  Quick DASH:  59.09%          TODAY'S TREATMENT    Fabricated custom orthosis: hand based thumb spica      EDUCATION: Risk/benefits discussed, " Home exercise program, orthosis wear schedule/ purpose/ precautions, care of orthosis, wound care, edema control, activity modifications, joint protection, pain/symptom management, injury pathology, and plan of care,        Orthosis purpose:  - protect  - position  - immobilize    Wear Schedule:  - At all times  - PRN   - may remove for bathing/dressing/hygiene   - may remove for HEP   - during activity/work   - while sleeping    Assessment:   Pt is 29 year old female who underwent capsulectomy of her L thumb MP joint and is presenting today for evaluation with complaints of decreased strength, decreased ROM, increased pain.  As a result of these impairments pt is having difficulty with upper body/lower body bathing and dressing tasks, difficulty with hair care tasks, is experiencing loss of sleep, and is having difficulty with home management and meal prep tasks and is unable to return to work without restrictions.  Without skilled intervention patient is at risk for further loss of ROM, loss of strength, reduced ADL/IADL function, and is at risk for requiring caregiver assistance for self care completion.  Patient to benefit from skilled services to address the impairments found in order to return to independent prior level of function.      Plan:  Planned Interventions include: therapeutic exercise, self-care home management, manual therapy, therapeutic activities, , neuromuscular coordination, vasopneumatic, dry needling, and orthosis fabrication.  Frequency: 2 x Week  Duration: 6 Weeks  Goals: Set and discussed today      Goals - Patient will:       Goal 1) verbalize/demonstrate/understanding of diagnosis and precautions       Goal 2) verbalize purpose of orthosis, wearing schedule, care and precautions       Goal 3) don/doff orthosis correctly and independently       Goal 4) verbalize/demonstrate home program, activity modification and/or adaptive equipment    All goals set today and have been met.    Plan of  care was developed with input and agreement by the patient      Haydee Sanders, OT

## 2024-06-20 ENCOUNTER — APPOINTMENT (OUTPATIENT)
Dept: OCCUPATIONAL THERAPY | Facility: CLINIC | Age: 29
End: 2024-06-20
Payer: COMMERCIAL

## 2024-06-24 ENCOUNTER — APPOINTMENT (OUTPATIENT)
Dept: ORTHOPEDIC SURGERY | Facility: CLINIC | Age: 29
End: 2024-06-24
Payer: COMMERCIAL

## 2024-06-24 ENCOUNTER — TREATMENT (OUTPATIENT)
Dept: OCCUPATIONAL THERAPY | Facility: CLINIC | Age: 29
End: 2024-06-24
Payer: COMMERCIAL

## 2024-06-24 VITALS — BODY MASS INDEX: 27.31 KG/M2 | HEIGHT: 64 IN | WEIGHT: 160 LBS

## 2024-06-24 DIAGNOSIS — Z02.6 ENCOUNTER RELATED TO WORKER'S COMPENSATION CLAIM: Primary | ICD-10-CM

## 2024-06-24 DIAGNOSIS — M24.542 CONTRACTURE OF THUMB JOINT, LEFT: Primary | ICD-10-CM

## 2024-06-24 PROCEDURE — 97140 MANUAL THERAPY 1/> REGIONS: CPT | Mod: GO | Performed by: OCCUPATIONAL THERAPIST

## 2024-06-24 PROCEDURE — 97112 NEUROMUSCULAR REEDUCATION: CPT | Mod: GO | Performed by: OCCUPATIONAL THERAPIST

## 2024-06-24 ASSESSMENT — PAIN DESCRIPTION - DESCRIPTORS: DESCRIPTORS: TINGLING;SORE

## 2024-06-24 ASSESSMENT — PAIN - FUNCTIONAL ASSESSMENT: PAIN_FUNCTIONAL_ASSESSMENT: 0-10

## 2024-06-24 ASSESSMENT — PAIN SCALES - GENERAL: PAINLEVEL_OUTOF10: 5 - MODERATE PAIN

## 2024-06-24 NOTE — PROGRESS NOTES
[facility]  Hand and Upper Extremity Service  Post Operative visit         Date of surgery: 6/13/24    Surgery(s) performed: Left thumb MP joint capsulectomy        Subjective report: First postoperative visit. She has initiated therapy last week and at her first session, she was able to demonstrate a little thumb MP joint motion but it's stiff. She's scheduled for another session this evening.        Exam findings: Left hand reveals healed surgical incision. Mild swelling around thumb MP joint and thenar immanence. Ecchymosis on the volar aspect of distal forearm. Limited ability to demonstrate or tolerate passive thumb MP joint motion today.        Radiograph findings: No new images obtained       Impression: Left thumb MP joint flexion contracture       Plan: We discussed postoperative findings and what we ended up performing, essentially requireing a volar plate and sesamoid excision to allow us to bring the MP joint back into flexion. Now we'll see what happens over time with her ability to reestablish thumb MP joint range of motion. If she's unable to do so and has rigid, painful thumb MP joint extension contracture, our only other option may be to consider a MP joint fusion in a more functional position. She'll continue with therapy and follow up with me in 4 weeks for clinical examination.        Follow Up: 4 weeks             Juan Kaminski MD    [facility] [employer]  Department of Orthopaedic Surgery  Chief of Hand and Upper Extremity Surgery  [facility]    Scribe Attestation  By signing my name below, IKaterine Scribe   attest that this documentation has been prepared under the direction and in the presence of Dr. Juan Kaminski.      Dictation performed with the use of voice recognition software. Syntax and grammatical errors may exist.

## 2024-06-24 NOTE — PROGRESS NOTES
Occupational Therapy Orthopedic Treatment Note    Patient Name: Harriet Washington  MRN: [id number]  Today's Date: 6/29/2024       Insurance:  Visit number: 2 of 12  Authorization info: required  C9 Certification Period  -  Start: 4/1/24  End: 8/2/24    Current Problem  1. Contracture of thumb joint, left            Referred by:  Dr. 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 and her thumb was struck by the students foot in the process  DOI: 11/13/23  DOS: 6/13/24      Hand dominance: Right  Effected extremity: Left    Medical History Form: Reviewed (scanned into chart)  Prior Level of Function (PLOF)  Patient previously independent with all ADLs/IADLs    ADL/IADL Deficits:  Bathing, Dressing, Feeding, Hygiene/Grooming, Hair care, Home mgt, Meal prep, and Work      Precautions:   NWB      Pain:   1-2/10  Location: L thumb MP  Description: sharp pain dorsal MP and aching volar MP tingling      Objective     LEFT HAND   (ext/flex)   MCP PIP DIP DPC   IF     WNL   MF    WNL   RF    WNL   SF    WNL   Thumb WNL  15 (A)        Outcome Measures:  Quick DASH:  59.09%          TODAY'S TREATMENT    Neuro:  - ASL AROM within fluidotherapy for desensitization and re-ed of proprioception for thumb x 12 min  - AROM opposition to each digits x 10rps each digits  - desensitization with sensory sticks soft to rough x 10min    Manual:  - scar massage, STM, and PROM of thumb MPJ to increase ROM and reduce stiffness x 12 min    EDUCATION: Risk/benefits discussed, Home exercise program, orthosis wear schedule/ purpose/ precautions, care of orthosis, wound care, edema control, activity modifications, joint protection, pain/symptom management, injury pathology, and plan of care,          Assessment:   Patient tolerated all intervention well today with low reactivity.  Patient exhibited greater discomfort with scar massage, although this would be expected at this early point in her recovery. She understands to begin desensitization of her scar at home with soft to rough textures, moving forward, which would ideally reduce her post surgical pain.      Plan:  Planned Interventions include: therapeutic exercise, self-care home management, manual therapy, therapeutic activities, , neuromuscular coordination, vasopneumatic, dry needling, and orthosis fabrication.  Frequency: 2 x Week  Duration: 6 Weeks  Goals: Set and discussed today      Goals - Patient will:       Goal 1) verbalize/demonstrate/understanding of diagnosis and precautions       Goal 2) verbalize purpose of orthosis, wearing schedule, care and precautions       Goal 3) don/doff orthosis correctly and independently       Goal 4) verbalize/demonstrate home program, activity modification and/or adaptive equipment    All goals set today and have been met.    Plan of care was developed with input and agreement by the patient      Haydee Sanders OT

## 2024-06-27 ENCOUNTER — TREATMENT (OUTPATIENT)
Dept: OCCUPATIONAL THERAPY | Facility: CLINIC | Age: 29
End: 2024-06-27
Payer: COMMERCIAL

## 2024-06-27 DIAGNOSIS — M24.542 CONTRACTURE OF THUMB JOINT, LEFT: Primary | ICD-10-CM

## 2024-06-27 PROCEDURE — 97140 MANUAL THERAPY 1/> REGIONS: CPT | Mod: GO | Performed by: OCCUPATIONAL THERAPIST

## 2024-06-27 PROCEDURE — 97112 NEUROMUSCULAR REEDUCATION: CPT | Mod: GO | Performed by: OCCUPATIONAL THERAPIST

## 2024-06-27 NOTE — PROGRESS NOTES
"  Occupational Therapy Orthopedic Treatment Note    Patient Name: Harriet Washington  MRN: [id number]  Today's Date: 6/28/2024       Insurance:  Visit number: 3 of 12  Authorization info: required  Medicare Certification Period  -  Start: 4/1/24  End: 8/2/24    Current Problem  1. Contracture of thumb joint, left            Referred by:  Dr. Kaminski   Reason for visit:  L thumb Mp joint contracture release    Subjective   \"My thumb definitely hurts less but the skin is .  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 and her thumb was struck by the students foot in the process  DOI: 11/13/23  DOS: 6/13/24        Precautions:   NWB      Pain:   1-2/10  Location: L thumb MP  Description: sharp pain dorsal MP and aching volar MP tingling      Objective     LEFT HAND   (ext/flex)   MCP PIP DIP DPC   IF     WNL   MF    WNL   RF    WNL   SF    WNL   Thumb WNL  15 (A)        Outcome Measures:  Quick DASH:  59.09%          TODAY'S TREATMENT    Neuro:  - ASL AROM within fluidotherapy for desensitization and re-ed of proprioception for thumb x 12 min  - AROM opposition to each digits x 10rps each digits  - assisted ROM opposition to SF DIP with AROM IPJ flexion down SF 3x10  - crumble and spread paper to re-ed proprioception of thumb manipulation skills x 5 min    Manual:  - scar massage, STM, and PROM of thumb MPJ to increase ROM and reduce stiffness x 12 min        EDUCATION: Risk/benefits discussed, Home exercise program, orthosis wear schedule/ purpose/ precautions, care of orthosis, wound care, edema control, activity modifications, joint protection, pain/symptom management, injury pathology, and plan of care,          Assessment:   Patient tolerated all manual intervention today with low reactivity citing improved comfort to the thenar eminence compared to previous visit.  She was able to actively oppose her thumb to her small finger DIP although would partially " compensate with attempted IP flexion at that point due to stiffness.  Manual assistance was provided to reduce compensatory movements.  IPJ demonstrated shaking as it flexed to the small finger P2 although this is encouraging progress since her last visit a couple days ago.  Difficulty actively flexing her MP joint due to stiffness. Excellent understanding of her upgraded HEP.      Plan:  Planned Interventions include: therapeutic exercise, self-care home management, manual therapy, therapeutic activities, , neuromuscular coordination, vasopneumatic, dry needling, and orthosis fabrication.  Frequency: 2 x Week  Duration: 6 Weeks  Goals: Set and discussed today      Goals - Patient will:       Goal 1) verbalize/demonstrate/understanding of diagnosis and precautions       Goal 2) verbalize purpose of orthosis, wearing schedule, care and precautions       Goal 3) don/doff orthosis correctly and independently       Goal 4) verbalize/demonstrate home program, activity modification and/or adaptive equipment    All goals set today and have been met.    Plan of care was developed with input and agreement by the patient      Haydee Sanders OT

## 2024-07-01 ENCOUNTER — TREATMENT (OUTPATIENT)
Dept: OCCUPATIONAL THERAPY | Facility: CLINIC | Age: 29
End: 2024-07-01
Payer: COMMERCIAL

## 2024-07-05 ENCOUNTER — TREATMENT (OUTPATIENT)
Dept: OCCUPATIONAL THERAPY | Facility: CLINIC | Age: 29
End: 2024-07-05
Payer: COMMERCIAL

## 2024-07-05 DIAGNOSIS — M24.542 CONTRACTURE OF THUMB JOINT, LEFT: Primary | ICD-10-CM

## 2024-07-05 PROCEDURE — 97110 THERAPEUTIC EXERCISES: CPT | Mod: GO | Performed by: OCCUPATIONAL THERAPIST

## 2024-07-05 NOTE — PROGRESS NOTES
"  Occupational Therapy Orthopedic Treatment Note    Patient Name: Harriet Washington  MRN: [id number]  Today's Date: 7/5/2024       Insurance:  Visit number: 4 of 12  Authorization info: required  Medicare Certification Period  -  Start: 4/1/24  End: 8/2/24    Current Problem  1. Contracture of thumb joint, left            Referred by:  Dr. Kaminski   Reason for visit:  L thumb Mp joint contracture release    Subjective   \"My thumb has been up and down this week with swelling.  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 and her thumb was struck by the students foot in the process  DOI: 11/13/23  DOS: 6/13/24        Precautions:   NWB      Pain:   1-2/10  Location: L thumb MP  Description: sharp pain dorsal MP and aching volar MP tingling      Objective     LEFT HAND   (ext/flex)    MP IP DPC   IF     WNL   MF    WNL   RF    WNL   SF    WNL   Thumb  10 A 35 (A)        Outcome Measures:  Quick DASH:  59.09%          TODAY'S TREATMENT    Neuro:  - ASL AROM within fluidotherapy for desensitization and re-ed of proprioception for thumb x 8 min  - AROM opposition to each digits x 10rps each digits  - assisted ROM opposition to SF DIP with AROM IPJ flexion down SF 3x10  - crumble and spread paper to re-ed proprioception of thumb manipulation skills x 5 min  - translating around edge of paper to re-ed thumb proprioception x 5 min    Ultrasound:  - 100% x 1.0Wcm2 to volar and dorsal thumb MP to increase joint and scar tissue mobility x 8 min        EDUCATION: Risk/benefits discussed, Home exercise program, orthosis wear schedule/ purpose/ precautions, care of orthosis, wound care, edema control, activity modifications, joint protection, pain/symptom management, injury pathology, and plan of care,          Assessment:   With ultrasound and proprioception input she exhibits improved ability to actively oppose her thumb to her finger.  Her thumb IP joint is progressing " faster rate than her MP joint this would be expected.  MP joint overall is firm to flexion endrange.      Plan:  Planned Interventions include: therapeutic exercise, self-care home management, manual therapy, therapeutic activities, , neuromuscular coordination, vasopneumatic, dry needling, and orthosis fabrication.  Frequency: 2 x Week  Duration: 6 Weeks  Goals: Set and discussed today      Goals - Patient will:       Goal 1) verbalize/demonstrate/understanding of diagnosis and precautions       Goal 2) verbalize purpose of orthosis, wearing schedule, care and precautions       Goal 3) don/doff orthosis correctly and independently       Goal 4) verbalize/demonstrate home program, activity modification and/or adaptive equipment    All goals set today and have been met.    Plan of care was developed with input and agreement by the patient      Haydee Sanders OT

## 2024-07-08 ENCOUNTER — TREATMENT (OUTPATIENT)
Dept: OCCUPATIONAL THERAPY | Facility: CLINIC | Age: 29
End: 2024-07-08
Payer: COMMERCIAL

## 2024-07-08 DIAGNOSIS — M24.542 CONTRACTURE OF THUMB JOINT, LEFT: Primary | ICD-10-CM

## 2024-07-08 PROCEDURE — 97035 APP MDLTY 1+ULTRASOUND EA 15: CPT | Mod: GO | Performed by: OCCUPATIONAL THERAPIST

## 2024-07-08 PROCEDURE — 97110 THERAPEUTIC EXERCISES: CPT | Mod: GO | Performed by: OCCUPATIONAL THERAPIST

## 2024-07-08 NOTE — PROGRESS NOTES
"  Occupational Therapy Orthopedic Treatment Note    Patient Name: Harriet Washington  MRN: [id number]  Today's Date: 7/9/2024  Time Calculation  Start Time: 0445  Stop Time: 0525  Time Calculation (min): 40 min  Total timed code: 38      Insurance:  Visit number: 5 of 12  Authorization info: required  Authorization Certification Period  -  Start: 4/1/24  End: 8/2/24      Current Problem  1. Contracture of thumb joint, left          Referred by:  Dr. 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 and her thumb was struck by the students foot in the process  DOI: 11/13/23  DOS: 6/13/24    Precautions:   NWB      Pain:   1-2/10  Location: L thumb MP  Description: sharp pain dorsal MP and aching volar MP tingling      Objective     LEFT HAND   (ext/flex)    MP IP DPC   IF     WNL   MF    WNL   RF    WNL   SF    WNL   Thumb  15 A 40 (A)        Outcome Measures:  Quick DASH:  59.09%          TODAY'S TREATMENT    Neuro:  - ASL AROM within fluidotherapy for desensitization and re-ed of proprioception for thumb x 10 min  - AROM opposition to each digits x 10rps each digits  - assisted ROM opposition to SF DIP with AROM IPJ flexion down SF 3x10  - crumble and spread paper to re-ed proprioception of thumb manipulation skills x 5 min  - translating around edge of paper to re-ed thumb proprioception x 5 min    Ultrasound:  - 100% x 1.0Wcm2 to volar and dorsal thumb MP to increase joint and scar tissue mobility x 8 min        EDUCATION: Risk/benefits discussed, Home exercise program, orthosis wear schedule/ purpose/ precautions, care of orthosis, wound care, edema control, activity modifications, joint protection, pain/symptom management, injury pathology, and plan of care,          Assessment:   Continues to demo reduced scar tightness following ultrasound and " proprioception input.  MP joint continues to remain firm at endrange ultimately contributing to difficulties with opposition active motion.  Despite her endrange her MP joint motion but does exhibit 5 degrees improved flexion for total of 15 active degrees.      Plan:  Planned Interventions include: therapeutic exercise, self-care home management, manual therapy, therapeutic activities, , neuromuscular coordination, vasopneumatic, dry needling, and orthosis fabrication.  Frequency: 2 x Week  Duration: 6 Weeks  Goals: Set and discussed today      Goals - Patient will:       Goal 1) verbalize/demonstrate/understanding of diagnosis and precautions       Goal 2) verbalize purpose of orthosis, wearing schedule, care and precautions       Goal 3) don/doff orthosis correctly and independently       Goal 4) verbalize/demonstrate home program, activity modification and/or adaptive equipment    All goals set today and have been met.    Plan of care was developed with input and agreement by the patient      Haydee Sanders OT

## 2024-07-11 ENCOUNTER — TREATMENT (OUTPATIENT)
Dept: OCCUPATIONAL THERAPY | Facility: CLINIC | Age: 29
End: 2024-07-11
Payer: COMMERCIAL

## 2024-07-11 DIAGNOSIS — M24.542 CONTRACTURE OF THUMB JOINT, LEFT: Primary | ICD-10-CM

## 2024-07-11 PROCEDURE — 97035 APP MDLTY 1+ULTRASOUND EA 15: CPT | Mod: GO | Performed by: OCCUPATIONAL THERAPIST

## 2024-07-11 PROCEDURE — 97112 NEUROMUSCULAR REEDUCATION: CPT | Mod: GO | Performed by: OCCUPATIONAL THERAPIST

## 2024-07-11 PROCEDURE — 97140 MANUAL THERAPY 1/> REGIONS: CPT | Mod: GO | Performed by: OCCUPATIONAL THERAPIST

## 2024-07-11 NOTE — PROGRESS NOTES
"  Occupational Therapy Orthopedic Treatment Note    Patient Name: Harriet Washington  MRN: [id number]  Today's Date: 7/15/2024  Time Calculation  Start Time: 0430  Stop Time: 0510  Time Calculation (min): 40 min  Total timed code: 38      Insurance:  Visit number: 6 of 12  Authorization info: required  Authorization Certification Period  -  Start: 4/1/24  End: 8/2/24      Current Problem  1. Contracture of thumb joint, left            Referred by:  Dr. 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 and her thumb was struck by the students foot in the process  DOI: 11/13/23  DOS: 6/13/24    Precautions:   NWB      Pain:   1-2/10  Location: L thumb MP  Description: sharp pain dorsal MP and aching volar MP tingling      Objective     LEFT HAND   (ext/flex)    MP IP DPC   IF     WNL   MF    WNL   RF    WNL   SF    WNL   Thumb  15 A 40 (A)        Outcome Measures:  Quick DASH:  59.09%          TODAY'S TREATMENT    Neuro:  - ASL AROM within fluidotherapy for desensitization and re-ed of proprioception for thumb x 12 min  - AROM opposition to each digits x 10rps each digits  - assisted ROM opposition to SF DIP with AROM IPJ flexion down SF 3x10  - forceful AROM opposition to SF P2     Manual:  - scar massage to volar and dorsal thumb MP joint to reduce underlying restrictions and improve joint mobility x 12min    Ultrasound:  - 100% x 1.0Wcm2 to volar and dorsal thumb MP to increase joint and scar tissue mobility x 8 min        EDUCATION: Risk/benefits discussed, Home exercise program, orthosis wear schedule/ purpose/ precautions, care of orthosis, wound care, edema control, activity modifications, joint protection, pain/symptom management, injury pathology, and plan of care,          Assessment:   With numerous trials of max effort patient was able to " oppose her thumb to the SF P2 at end of session today.      Plan:  Planned Interventions include: therapeutic exercise, self-care home management, manual therapy, therapeutic activities, , neuromuscular coordination, vasopneumatic, dry needling, and orthosis fabrication.  Frequency: 2 x Week  Duration: 6 Weeks  Goals: Set and discussed today      Goals - Patient will:       Goal 1) verbalize/demonstrate/understanding of diagnosis and precautions       Goal 2) verbalize purpose of orthosis, wearing schedule, care and precautions       Goal 3) don/doff orthosis correctly and independently       Goal 4) verbalize/demonstrate home program, activity modification and/or adaptive equipment    All goals set today and have been met.    Plan of care was developed with input and agreement by the patient      Haydee Sanders OT

## 2024-07-15 ENCOUNTER — TREATMENT (OUTPATIENT)
Dept: OCCUPATIONAL THERAPY | Facility: CLINIC | Age: 29
End: 2024-07-15
Payer: COMMERCIAL

## 2024-07-15 DIAGNOSIS — M24.542 CONTRACTURE OF THUMB JOINT, LEFT: Primary | ICD-10-CM

## 2024-07-15 PROCEDURE — 97112 NEUROMUSCULAR REEDUCATION: CPT | Mod: GO | Performed by: OCCUPATIONAL THERAPIST

## 2024-07-15 PROCEDURE — 97110 THERAPEUTIC EXERCISES: CPT | Mod: GO | Performed by: OCCUPATIONAL THERAPIST

## 2024-07-15 NOTE — PROGRESS NOTES
"  Occupational Therapy Orthopedic Treatment Note    Patient Name: Harriet Washington  MRN: [id number]  Today's Date: 7/16/2024  Time Calculation  Start Time: 0445  Stop Time: 0525  Time Calculation (min): 40 min  Total timed code: 38      Insurance:  Visit number: 7 of 12  Authorization info: required  Authorization Certification Period  -  Start: 4/1/24  End: 8/2/24      Current Problem  1. Contracture of thumb joint, left            Referred by:  Dr. Kaminski   Reason for visit:  L thumb Mp joint contracture release    Subjective   \"My thumb has been swollen since we pushed it more last session.  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 and her thumb was struck by the students foot in the process  DOI: 11/13/23  DOS: 6/13/24    Precautions:   NWB      Pain:   1-2/10  Location: L thumb MP  Description: sharp pain dorsal MP and aching volar MP tingling      Objective     LEFT HAND   (ext/flex)    MP IP DPC   IF     WNL   MF    WNL   RF    WNL   SF    WNL   Thumb  15 A 40 (A)        Outcome Measures:  Quick DASH:  59.09%          TODAY'S TREATMENT    Neuro:  - ASL AROM within fluidotherapy for desensitization and re-ed of proprioception for thumb x 12 min  - AROM opposition to each digits x 10rps each digits  - assisted ROM opposition to SF DIP with AROM IPJ flexion down SF 3x10  - AROM opposition to SF P2     Therapeutic ex:  - graded lateral and 3pt pinching to increase thumb strength x 5 min  - large knob puttycise to increase thumb strength x 5 min  - small knob puttycise to increase thumb strength x 5 min      Ultrasound:  - 100% x 1.0Wcm2 to volar and dorsal thumb MP to increase joint and scar tissue mobility x 8 min        EDUCATION: Risk/benefits discussed, Home exercise program, orthosis wear schedule/ purpose/ precautions, care of orthosis, wound care, edema control, activity modifications, joint protection, pain/symptom management, injury " pathology, and plan of care,          Assessment:   With numerous trials of max effort patient was able to oppose her thumb to the SF P2 at end of session today.      Plan:  Planned Interventions include: therapeutic exercise, self-care home management, manual therapy, therapeutic activities, , neuromuscular coordination, vasopneumatic, dry needling, and orthosis fabrication.  Frequency: 2 x Week  Duration: 6 Weeks  Goals: Set and discussed today      Goals - Patient will:       Goal 1) verbalize/demonstrate/understanding of diagnosis and precautions       Goal 2) verbalize purpose of orthosis, wearing schedule, care and precautions       Goal 3) don/doff orthosis correctly and independently       Goal 4) verbalize/demonstrate home program, activity modification and/or adaptive equipment    All goals set today and have been met.    Plan of care was developed with input and agreement by the patient      Haydee Sanders OT

## 2024-07-18 ENCOUNTER — TREATMENT (OUTPATIENT)
Dept: OCCUPATIONAL THERAPY | Facility: CLINIC | Age: 29
End: 2024-07-18
Payer: COMMERCIAL

## 2024-07-18 DIAGNOSIS — M24.542 CONTRACTURE OF THUMB JOINT, LEFT: ICD-10-CM

## 2024-07-18 PROCEDURE — 97140 MANUAL THERAPY 1/> REGIONS: CPT | Mod: GO | Performed by: OCCUPATIONAL THERAPIST

## 2024-07-18 PROCEDURE — 97035 APP MDLTY 1+ULTRASOUND EA 15: CPT | Mod: GO | Performed by: OCCUPATIONAL THERAPIST

## 2024-07-18 PROCEDURE — 97112 NEUROMUSCULAR REEDUCATION: CPT | Mod: GO | Performed by: OCCUPATIONAL THERAPIST

## 2024-07-18 NOTE — PROGRESS NOTES
"  Occupational Therapy Orthopedic Treatment Note    Patient Name: Harriet Washington  MRN: [id number]  Today's Date: 7/18/2024     Total timed code: 38      Insurance:  Visit number: 8 of 12  Authorization info: required  Authorization Certification Period  -  Start: 4/1/24  End: 8/2/24      Current Problem  1. Contracture of thumb joint, left  Follow Up In Occupational Therapy          Referred by:  Dr. Kaminski   Reason for visit:  L thumb Mp joint contracture release    Subjective   \"My thumb has been swollen since we pushed it more last session.  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 and her thumb was struck by the students foot in the process  DOI: 11/13/23  DOS: 6/13/24    Precautions:   NWB      Pain:   1-2/10  Location: L thumb MP  Description: sharp pain dorsal MP and aching volar MP tingling      Objective     LEFT HAND   (ext/flex)    MP IP DPC   IF     WNL   MF    WNL   RF    WNL   SF    WNL   Thumb  15 A 40 (A)        Outcome Measures:  Quick DASH:  59.09%          TODAY'S TREATMENT    Neuro:  - ASL AROM within fluidotherapy for desensitization and re-ed of proprioception for thumb x 12 min  - AROM opposition to each digits x 10rps each digits  - assisted ROM opposition to SF DIP with AROM IPJ flexion down SF 3x10  - AROM opposition to SF P2     Therapeutic ex:  - graded lateral and 3pt pinching to increase thumb strength x 5 min  - large knob puttycise to increase thumb strength x 5 min  - small knob puttycise to increase thumb strength x 5 min      Ultrasound:  - 100% x 1.0Wcm2 to volar and dorsal thumb MP to increase joint and scar tissue mobility x 8 min        EDUCATION: Risk/benefits discussed, Home exercise program, orthosis wear schedule/ purpose/ precautions, care of orthosis, wound care, edema control, activity modifications, joint protection, pain/symptom management, injury pathology, and plan of care,          Assessment: "   Tolerating resistance with low reactivity.  Still having difficulty oppossing beyond SF P2 given joint mobility challenges at the thumb MP.      Plan:  Planned Interventions include: therapeutic exercise, self-care home management, manual therapy, therapeutic activities, , neuromuscular coordination, vasopneumatic, dry needling, and orthosis fabrication.  Frequency: 2 x Week  Duration: 6 Weeks  Goals: Set and discussed today      Goals - Patient will:       Goal 1) verbalize/demonstrate/understanding of diagnosis and precautions       Goal 2) verbalize purpose of orthosis, wearing schedule, care and precautions       Goal 3) don/doff orthosis correctly and independently       Goal 4) verbalize/demonstrate home program, activity modification and/or adaptive equipment    All goals set today and have been met.    Plan of care was developed with input and agreement by the patient      Haydee Sanders OT

## 2024-07-22 ENCOUNTER — TREATMENT (OUTPATIENT)
Dept: OCCUPATIONAL THERAPY | Facility: CLINIC | Age: 29
End: 2024-07-22
Payer: COMMERCIAL

## 2024-07-22 DIAGNOSIS — M24.542 CONTRACTURE OF THUMB JOINT, LEFT: ICD-10-CM

## 2024-07-22 PROCEDURE — 97035 APP MDLTY 1+ULTRASOUND EA 15: CPT | Mod: GO | Performed by: OCCUPATIONAL THERAPIST

## 2024-07-22 PROCEDURE — 97140 MANUAL THERAPY 1/> REGIONS: CPT | Mod: GO | Performed by: OCCUPATIONAL THERAPIST

## 2024-07-22 PROCEDURE — 97110 THERAPEUTIC EXERCISES: CPT | Mod: GO | Performed by: OCCUPATIONAL THERAPIST

## 2024-07-23 NOTE — PROGRESS NOTES
"  Occupational Therapy Orthopedic Treatment Note    Patient Name: Harriet Washington  MRN: [id number]  Today's Date: 7/23/2024     Total timed code: 38      Insurance:  Visit number: 9 of 12  Authorization info: required  Authorization Certification Period  -  Start: 4/1/24  End: 8/2/24      Current Problem  1. Contracture of thumb joint, left  Follow Up In Occupational Therapy          Referred by:  Dr. Kaminski   Reason for visit:  L thumb Mp joint contracture release    Subjective   \"My thumb has been swollen since we pushed it more last session.  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 and her thumb was struck by the students foot in the process  DOI: 11/13/23  DOS: 6/13/24    Precautions:   NWB      Pain:   1-2/10  Location: L thumb MP  Description: sharp pain dorsal MP and aching volar MP tingling      Objective     LEFT HAND   (ext/flex)    MP IP DPC   IF     WNL   MF    WNL   RF    WNL   SF    WNL   Thumb  15 A 40 (A)        Outcome Measures:  Quick DASH:  59.09%          TODAY'S TREATMENT    Neuro:  - ASL AROM within fluidotherapy for desensitization and re-ed of proprioception for thumb x 12 min  - AROM opposition to each digits x 10rps each digits  - assisted ROM opposition to SF DIP with AROM IPJ flexion down SF 3x10  - AROM opposition to SF P2 with place and hold    Manual:  - gentle STM and IP/MP joint mobilization to increase thumb mobility x 10 min    Ultrasound:  100% x 1.0Wcm2 to increase scar tissue extensibility x 8 min        Ultrasound:  - 100% x 1.0Wcm2 to volar and dorsal thumb MP to increase joint and scar tissue mobility x 8 min        EDUCATION: Risk/benefits discussed, Home exercise program, orthosis wear schedule/ purpose/ precautions, care of orthosis, wound care, edema control, activity modifications, joint protection, pain/symptom management, injury pathology, and plan of care,          Assessment:   Tolerating resistance " with low reactivity.  Still having difficulty oppossing beyond SF P2 given joint mobility challenges at the thumb MP.      Plan:  Planned Interventions include: therapeutic exercise, self-care home management, manual therapy, therapeutic activities, , neuromuscular coordination, vasopneumatic, dry needling, and orthosis fabrication.  Frequency: 2 x Week  Duration: 6 Weeks  Goals: Set and discussed today      Goals - Patient will:       Goal 1) verbalize/demonstrate/understanding of diagnosis and precautions       Goal 2) verbalize purpose of orthosis, wearing schedule, care and precautions       Goal 3) don/doff orthosis correctly and independently       Goal 4) verbalize/demonstrate home program, activity modification and/or adaptive equipment    All goals set today and have been met.    Plan of care was developed with input and agreement by the patient      Haydee Sanders OT

## 2024-07-25 ENCOUNTER — TREATMENT (OUTPATIENT)
Dept: OCCUPATIONAL THERAPY | Facility: CLINIC | Age: 29
End: 2024-07-25
Payer: COMMERCIAL

## 2024-07-25 DIAGNOSIS — M24.542 CONTRACTURE OF THUMB JOINT, LEFT: ICD-10-CM

## 2024-07-25 PROCEDURE — 97763 ORTHC/PROSTC MGMT SBSQ ENC: CPT | Mod: GO | Performed by: OCCUPATIONAL THERAPIST

## 2024-07-25 NOTE — PROGRESS NOTES
"  Occupational Therapy Orthopedic Treatment Note    Patient Name: Harriet Washington  MRN: [id number]  Today's Date: 7/29/2024  Time Calculation  Start Time: 0430  Stop Time: 0505  Time Calculation (min): 35 min  Total timed code: 38      Insurance:  Visit number: 10 of 12  Authorization info: required  Authorization Certification Period  -  Start: 4/1/24  End: 8/2/24      Current Problem  1. Contracture of thumb joint, left  Follow Up In Occupational Therapy          Referred by:  Dr. Kaminski   Reason for visit:  L thumb Mp joint contracture release    Subjective   \"Im not sure how much more its going to change. 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 and her thumb was struck by the students foot in the process  DOI: 11/13/23  DOS: 6/13/24    Precautions:   NWB      Pain:   1-2/10  Location: L thumb MP  Description: sharp pain dorsal MP and aching volar MP tingling      Objective     LEFT HAND   (ext/flex)    MP IP DPC   IF     WNL   MF    WNL   RF    WNL   SF    WNL   Thumb  15 A 40 (A)        Outcome Measures:  Quick DASH:  59.09%          TODAY'S TREATMENT    Orthosis mgt/sbsq  -Hand-based thumb spica was modified to attach a guitar to nerve component to the ulnar aspect promoting a progressive stretching approach x 25 min      EDUCATION: Risk/benefits discussed, Home exercise program, orthosis wear schedule/ purpose/ precautions, care of orthosis, wound care, edema control, activity modifications, joint protection, pain/symptom management, injury pathology, and plan of care,          Assessment:   Patient was understanding and agreement with the modification made to the thumb spica orthosis.  She understands the orthosis wear and stretching schedule to be performed at least 3 times a day with 30 to 60 minutes.      Plan:  Planned Interventions include: therapeutic exercise, self-care home management, manual therapy, therapeutic " activities, , neuromuscular coordination, vasopneumatic, dry needling, and orthosis fabrication.  Frequency: 2 x Week  Duration: 6 Weeks  Goals: Set and discussed today      Goals - Patient will:       Goal 1) verbalize/demonstrate/understanding of diagnosis and precautions       Goal 2) verbalize purpose of orthosis, wearing schedule, care and precautions       Goal 3) don/doff orthosis correctly and independently       Goal 4) verbalize/demonstrate home program, activity modification and/or adaptive equipment    All goals set today and have been met.    Plan of care was developed with input and agreement by the patient      Haydee Sanders OT

## 2024-07-29 ENCOUNTER — APPOINTMENT (OUTPATIENT)
Dept: OCCUPATIONAL THERAPY | Facility: CLINIC | Age: 29
End: 2024-07-29
Payer: COMMERCIAL

## 2024-08-01 ENCOUNTER — TREATMENT (OUTPATIENT)
Dept: OCCUPATIONAL THERAPY | Facility: CLINIC | Age: 29
End: 2024-08-01
Payer: COMMERCIAL

## 2024-08-01 DIAGNOSIS — M24.542 CONTRACTURE OF THUMB JOINT, LEFT: ICD-10-CM

## 2024-08-01 PROCEDURE — 97763 ORTHC/PROSTC MGMT SBSQ ENC: CPT | Mod: GO | Performed by: OCCUPATIONAL THERAPIST

## 2024-08-01 NOTE — PROGRESS NOTES
Occupational Therapy Orthopedic Treatment Note    Patient Name: Harriet Washington  MRN: [id number]  Today's Date: 8/1/2024  Time Calculation  Start Time: 0430  Stop Time: 0450  Time Calculation (min): 20 min  Total timed code: 18      Insurance:  Visit number: 11 of 12  Authorization info: required  Authorization Certification Period  -  Start: 4/1/24  End: 8/2/24      Current Problem  1. Contracture of thumb joint, left  Follow Up In Occupational Therapy          Referred by:  Dr. Kaminski   Reason for visit:  L thumb Mp joint contracture release    Subjective   Patient reports she is using her wrist progress note splint for stretching 2-3 times a day although is unsure how long she is stretching for as she has not time her stretching sessions.    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 and her thumb was struck by the students foot in the process  DOI: 11/13/23  DOS: 6/13/24    Precautions:   NWB      Pain:   1-2/10  Location: L thumb MP  Description: sharp pain dorsal MP and aching volar MP tingling      Objective     LEFT HAND   (ext/flex)    MP IP DPC   IF     WNL   MF    WNL   RF    WNL   SF    WNL   Thumb  15 A 43 (A)        Outcome Measures:  Quick DASH:  59.09%          TODAY'S TREATMENT    Orthosis mgt/sbsq  -Hand-based thumb spica was modified to 2 volar thumb portion to allow greater IPJ freedom to stretch into flexion plane of movement and reassessed proper fit x 10 minutes      EDUCATION: Risk/benefits discussed, Home exercise program, orthosis wear schedule/ purpose/ precautions, care of orthosis, wound care, edema control, activity modifications, joint protection, pain/symptom management, injury pathology, and plan of care,          Assessment:   Patient was understanding to be stretching for at least 30 to 60 minutes sessions 2-3 times a day in order to maximize her range of motion benefits from the static progressive splint.  She further understands that long-term stretching would be the  ideal treatment approach moving forward and if her progress plateaus in a month or so she should have a conversation with her surgeon regarding alternative treatment options.      Plan:  Planned Interventions include: therapeutic exercise, self-care home management, manual therapy, therapeutic activities, , neuromuscular coordination, vasopneumatic, dry needling, and orthosis fabrication.  Frequency: 2 x Week  Duration: 6 Weeks  Goals: Set and discussed today      Goals - Patient will:       Goal 1) verbalize/demonstrate/understanding of diagnosis and precautions       Goal 2) verbalize purpose of orthosis, wearing schedule, care and precautions       Goal 3) don/doff orthosis correctly and independently       Goal 4) verbalize/demonstrate home program, activity modification and/or adaptive equipment    All goals set today and have been met.    Plan of care was developed with input and agreement by the patient      Haydee Sanders OT

## 2024-08-05 ENCOUNTER — APPOINTMENT (OUTPATIENT)
Dept: ORTHOPEDIC SURGERY | Facility: CLINIC | Age: 29
End: 2024-08-05
Payer: COMMERCIAL

## 2024-08-05 ENCOUNTER — OFFICE VISIT (OUTPATIENT)
Dept: ORTHOPEDIC SURGERY | Facility: CLINIC | Age: 29
End: 2024-08-05
Payer: COMMERCIAL

## 2024-08-05 VITALS — BODY MASS INDEX: 27.31 KG/M2 | HEIGHT: 64 IN | WEIGHT: 160 LBS

## 2024-08-05 DIAGNOSIS — Z02.6 ENCOUNTER RELATED TO WORKER'S COMPENSATION CLAIM: Primary | ICD-10-CM

## 2024-08-05 DIAGNOSIS — S60.222A: ICD-10-CM

## 2024-08-05 PROCEDURE — 3008F BODY MASS INDEX DOCD: CPT | Performed by: ORTHOPAEDIC SURGERY

## 2024-08-05 PROCEDURE — 1036F TOBACCO NON-USER: CPT | Performed by: ORTHOPAEDIC SURGERY

## 2024-08-05 PROCEDURE — 99024 POSTOP FOLLOW-UP VISIT: CPT | Performed by: ORTHOPAEDIC SURGERY

## 2024-08-05 RX ORDER — PREDNISONE 5 MG/1
TABLET ORAL
Qty: 42 TABLET | Refills: 0 | Status: SHIPPED | OUTPATIENT
Start: 2024-08-05

## 2024-08-05 ASSESSMENT — PAIN SCALES - GENERAL: PAINLEVEL_OUTOF10: 5 - MODERATE PAIN

## 2024-08-05 ASSESSMENT — PAIN DESCRIPTION - DESCRIPTORS: DESCRIPTORS: ACHING;SORE

## 2024-08-05 ASSESSMENT — PAIN - FUNCTIONAL ASSESSMENT: PAIN_FUNCTIONAL_ASSESSMENT: 0-10

## 2024-08-05 NOTE — PROGRESS NOTES
Postoperative follow-up after left thumb MP joint contracture release.  Surgery performed Jeanette 3.  Postoperatively she had significant swelling and stiffness of the entire thumb which forced the therapy to focus primarily on early CMC and IP joint range of motion.  With all therapy attempts she would then develop severe swelling and stiffness.  She is frustrated and concerned with the issues that she is having at the CMC and IP joint that were not present prior to surgery.  However, unfortunately she has reestablished a fairly significant extension contracture of the MP joint as well.    On examination today her surgical incision is well-healed.  She has some mild irritation along the surgical incision where some of her dissolvable subcuticular stitches are starting to work their way through the skin.  She has well reestablished her CMC joint motion.  She has a rigid MP joint extension contracture at 0 degrees of extension without any active or passive MP joint flexion.  Her active and passive IP joint motion is from about 0 to 20 to 25 degrees.    Impression: Left thumb posttraumatic MP joint contracture.    Plan: We have discussed this situation.  Certainly the presenting problem with such profound stiffness after a blunt trauma to the thumb is unusual and now the degree of stiffness and swelling she had after surgery does suggest some underlying inflammatory reaction which is interfering with her recovery from surgery.  I think at this point is unlikely that she is going to reestablish any functional degree of MP joint motion but she is more concerned with the IP joint issues.  Will get a put her on a short course of oral steroids and we will submit a C9 for therapy visits.  She will return in 4 weeks for repeat clinical examination.  I do believe that her pain and swelling and IP joint range of motion will continue to improve.  If she still has significant pain and stiffness at the MP joint and a nonfunctional  position of full extension we may have to consider MP joint fusion in a more functional position.    X-rays left thumb at next visit.    Juan Kaminski MD    [employer] [facility] [employer]  Department of Orthopaedic Surgery  Chief of Hand and Upper Extremity Surgery  [facility]    Dictation performed with the use of voice recognition software. Syntax and grammatical errors may exist.

## 2024-08-15 ENCOUNTER — TREATMENT (OUTPATIENT)
Dept: OCCUPATIONAL THERAPY | Facility: CLINIC | Age: 29
End: 2024-08-15
Payer: COMMERCIAL

## 2024-08-15 DIAGNOSIS — M24.542 CONTRACTURE OF THUMB JOINT, LEFT: ICD-10-CM

## 2024-08-15 PROCEDURE — 97035 APP MDLTY 1+ULTRASOUND EA 15: CPT | Mod: GO | Performed by: OCCUPATIONAL THERAPIST

## 2024-08-15 PROCEDURE — 97140 MANUAL THERAPY 1/> REGIONS: CPT | Mod: GO | Performed by: OCCUPATIONAL THERAPIST

## 2024-08-15 NOTE — PROGRESS NOTES
Occupational Therapy Orthopedic Treatment Note    Patient Name: Harriet Washington  MRN: [id number]  Today's Date: 8/20/2024  Time Calculation  Start Time: 0500  Stop Time: 0530  Time Calculation (min): 30 min  Total timed code: 18      Insurance:  Visit number: 11 of 12  Authorization info: required  Authorization Certification Period  -  Start: 4/1/24  End: 8/2/24      Current Problem  1. Contracture of thumb joint, left  Follow Up In Occupational Therapy          Referred by:  Dr. 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 and her thumb was struck by the students foot in the process  DOI: 11/13/23  DOS: 6/13/24    Precautions:   NWB      Pain:   1-2/10  Location: L thumb MP  Description: sharp pain dorsal MP and aching volar MP tingling      Objective     LEFT HAND   (ext/flex)    MP IP DPC   IF     WNL   MF    WNL   RF    WNL   SF    WNL   Thumb  15 A 45 (A)  50-55 P        Outcome Measures:  Quick DASH:  59.09%          TODAY'S TREATMENT    Ultrasound  - 100% x 1.0Wcm2 to all aspects of distal thumb to increase  IPJ ROM x 8 min    Manual  - scar massage, IPJ manipulation, and low loaded flexion stretching x 18 min    EDUCATION: Risk/benefits discussed, Home exercise program, orthosis wear schedule/ purpose/ precautions, care of orthosis, wound care, edema control, activity modifications, joint protection, pain/symptom management, injury pathology, and plan of care,          Assessment:   55 degrees AROM IP flexion with blocking technique post treatment.    Plan:  Planned Interventions include: therapeutic exercise, self-care home management, manual therapy, therapeutic activities, , neuromuscular coordination, vasopneumatic, dry needling, and orthosis fabrication.  Frequency: 2 x Week  Duration: 6 Weeks  Goals: Set  and discussed today      Goals - Patient will:       Goal 1) verbalize/demonstrate/understanding of diagnosis and precautions       Goal 2) verbalize purpose of orthosis, wearing schedule, care and precautions       Goal 3) don/doff orthosis correctly and independently       Goal 4) verbalize/demonstrate home program, activity modification and/or adaptive equipment    All goals set today and have been met.    Plan of care was developed with input and agreement by the patient      Haydee Sanders OT

## 2024-08-21 ENCOUNTER — TREATMENT (OUTPATIENT)
Dept: OCCUPATIONAL THERAPY | Facility: CLINIC | Age: 29
End: 2024-08-21
Payer: COMMERCIAL

## 2024-08-21 DIAGNOSIS — M24.542 CONTRACTURE OF THUMB JOINT, LEFT: Primary | ICD-10-CM

## 2024-08-21 PROCEDURE — 97140 MANUAL THERAPY 1/> REGIONS: CPT | Mod: GO | Performed by: OCCUPATIONAL THERAPIST

## 2024-08-21 PROCEDURE — 97035 APP MDLTY 1+ULTRASOUND EA 15: CPT | Mod: GO | Performed by: OCCUPATIONAL THERAPIST

## 2024-08-22 NOTE — PROGRESS NOTES
Occupational Therapy Orthopedic Treatment Note    Patient Name: Harriet Washington  MRN: [id number]  Today's Date: 8/26/2024  Time Calculation  Start Time: 0345  Stop Time: 0415  Time Calculation (min): 30 min  Total timed code: 26      Insurance:  Visit number: 2 of 4  Authorization info: required  Authorization Certification Period  -  Start: 4/1/24  End: 8/27/24      Current Problem  1. Contracture of thumb joint, left              Referred by:  Dr. 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 and her thumb was struck by the students foot in the process  DOI: 11/13/23  DOS: 6/13/24    Precautions:   NWB      Pain:   1-2/10  Location: L thumb MP  Description: sharp pain dorsal MP and aching volar MP tingling      Objective     LEFT HAND   (ext/flex)    MP IP DPC   IF     WNL   MF    WNL   RF    WNL   SF    WNL   Thumb  15 A 45 (A)  50-55 P        Outcome Measures:  Quick DASH:  59.09%          TODAY'S TREATMENT    Ultrasound  - 100% x 1.0Wcm2 to all aspects of distal thumb to increase  IPJ ROM x 8 min    Manual  - scar massage, IPJ manipulation, and low loaded flexion stretching x 18 min    EDUCATION: Risk/benefits discussed, Home exercise program, orthosis wear schedule/ purpose/ precautions, care of orthosis, wound care, edema control, activity modifications, joint protection, pain/symptom management, injury pathology, and plan of care,          Assessment:   45-50 degrees AROM IP flexion with blocking technique post treatment. MP joint remains quite stiff with little to no passive movement at this point.  With significant effort she can oppose to her SF P2.    Plan:  Planned Interventions include: therapeutic exercise, self-care home management, manual therapy, therapeutic activities, , neuromuscular coordination,  vasopneumatic, dry needling, and orthosis fabrication.  Frequency: 2 x Week  Duration: 6 Weeks  Goals: Set and discussed today      Goals - Patient will:       Goal 1) verbalize/demonstrate/understanding of diagnosis and precautions       Goal 2) verbalize purpose of orthosis, wearing schedule, care and precautions       Goal 3) don/doff orthosis correctly and independently       Goal 4) verbalize/demonstrate home program, activity modification and/or adaptive equipment    All goals set today and have been met.    Plan of care was developed with input and agreement by the patient      Haydee Sanders OT

## 2024-08-23 ENCOUNTER — TREATMENT (OUTPATIENT)
Dept: OCCUPATIONAL THERAPY | Facility: CLINIC | Age: 29
End: 2024-08-23
Payer: COMMERCIAL

## 2024-08-23 DIAGNOSIS — M24.542 CONTRACTURE OF THUMB JOINT, LEFT: Primary | ICD-10-CM

## 2024-08-23 PROCEDURE — 97140 MANUAL THERAPY 1/> REGIONS: CPT | Mod: GO | Performed by: OCCUPATIONAL THERAPIST

## 2024-08-23 PROCEDURE — 97035 APP MDLTY 1+ULTRASOUND EA 15: CPT | Mod: GO | Performed by: OCCUPATIONAL THERAPIST

## 2024-08-23 NOTE — PROGRESS NOTES
Occupational Therapy Orthopedic Treatment Note    Patient Name: Harriet Washington  MRN: [id number]  Today's Date: 8/26/2024  Time Calculation  Start Time: 0345  Stop Time: 0415  Time Calculation (min): 30 min  Total timed code:  26    Insurance:  Visit number: 3 of 4  Authorization info: required  Authorization Certification Period  -  Start: 4/1/24  End: 8/27/24      Current Problem  1. Contracture of thumb joint, left          Referred by:  Dr. 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 and her thumb was struck by the students foot in the process  DOI: 11/13/23  DOS: 6/13/24    Precautions:   NWB      Pain:   1-2/10  Location: L thumb MP  Description: sharp pain dorsal MP and aching volar MP tingling      Objective     LEFT HAND   (ext/flex)    MP IP DPC   IF     WNL   MF    WNL   RF    WNL   SF    WNL   Thumb  15 A 45 (A)  50-55 P        Outcome Measures:  Quick DASH:  59.09%          TODAY'S TREATMENT    Ultrasound  - 100% x 1.0Wcm2 to all aspects of distal thumb to increase  IPJ ROM x 8 min    Manual  - scar massage, cupping to scar,  IPJ manipulation, and muscle energy loaded flexion stretching x 18 min    EDUCATION: Risk/benefits discussed, Home exercise program, orthosis wear schedule/ purpose/ precautions, care of orthosis, wound care, edema control, activity modifications, joint protection, pain/symptom management, injury pathology, and plan of care,          Assessment:   50-55 degrees PROM IP flexion with blocking technique post treatment.  Patient experienced little reactivity to cupping intervention today.  She understands the benefit of this intervention was to ideally improve scar mobility to reduce underlying adhesions to improve range of motion.  Again, really significant effort she was able to close to her small finger volar P2 posttreatment.    Plan:  Planned Interventions include: therapeutic exercise,  self-care home management, manual therapy, therapeutic activities, , neuromuscular coordination, vasopneumatic, dry needling, and orthosis fabrication.  Frequency: 2 x Week  Duration: 6 Weeks  Goals: Set and discussed today      Goals - Patient will:       Goal 1) verbalize/demonstrate/understanding of diagnosis and precautions       Goal 2) verbalize purpose of orthosis, wearing schedule, care and precautions       Goal 3) don/doff orthosis correctly and independently       Goal 4) verbalize/demonstrate home program, activity modification and/or adaptive equipment    All goals set today and have been met.    Plan of care was developed with input and agreement by the patient      Haydee Sanders OT

## 2024-08-26 ENCOUNTER — TREATMENT (OUTPATIENT)
Dept: OCCUPATIONAL THERAPY | Facility: CLINIC | Age: 29
End: 2024-08-26
Payer: COMMERCIAL

## 2024-08-26 DIAGNOSIS — M24.542 CONTRACTURE OF THUMB JOINT, LEFT: Primary | ICD-10-CM

## 2024-08-26 PROCEDURE — 97140 MANUAL THERAPY 1/> REGIONS: CPT | Mod: GO | Performed by: OCCUPATIONAL THERAPIST

## 2024-08-26 PROCEDURE — 97035 APP MDLTY 1+ULTRASOUND EA 15: CPT | Mod: GO | Performed by: OCCUPATIONAL THERAPIST

## 2024-08-26 NOTE — PROGRESS NOTES
Occupational Therapy Orthopedic Treatment Note and Discharge    Patient Name: Harriet Washington  MRN: [id number]  Today's Date: 8/28/2024  Time Calculation  Start Time: 0400  Stop Time: 0425  Time Calculation (min): 25 min  Total timed code:  26    Insurance:  Visit number: 4 of 4  Authorization info: required  Authorization Certification Period  -  Start: 4/1/24  End: 8/27/24      Current Problem  1. Contracture of thumb joint, left            Referred by:  Dr. 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 and her thumb was struck by the students foot in the process  DOI: 11/13/23  DOS: 6/13/24    Precautions:   NWB      Pain:   1-2/10  Location: L thumb MP  Description: sharp pain dorsal MP and aching volar MP tingling      Objective     LEFT HAND   (ext/flex)    MP IP DPC   IF     WNL   MF    WNL   RF    WNL   SF    WNL   Thumb  10-15 A 50 (A)  60 P        Outcome Measures:  Quick DASH:  59.09%          TODAY'S TREATMENT    Ultrasound  - 100% x 1.0Wcm2 to all aspects of distal thumb to increase  IPJ ROM x 8 min    Manual  - scar massage, cupping to scar,  IPJ manipulation, and muscle energy loaded flexion stretching x 18 min    EDUCATION: Risk/benefits discussed, Home exercise program, orthosis wear schedule/ purpose/ precautions, care of orthosis, wound care, edema control, activity modifications, joint protection, pain/symptom management, injury pathology, and plan of care,          Assessment:   55-60 degrees PROM IP flexion with blocking technique post treatment.  Patient experienced little reactivity to cupping intervention today.  Pt understands importance to maintain consistency with long term HEP stretching with static progressive orthosis.      Plan:  discharge    Goals: Set and discussed today      Goals - Patient will:       Goal 1) verbalize/demonstrate/understanding of diagnosis and precautions       Goal 2)  verbalize purpose of orthosis, wearing schedule, care and precautions       Goal 3) don/doff orthosis correctly and independently       Goal 4) verbalize/demonstrate home program, activity modification and/or adaptive equipment    All goals set today and have been met.    Plan of care was developed with input and agreement by the patient      Haydee Sanders OT

## 2024-09-06 DIAGNOSIS — S60.222A: Primary | ICD-10-CM

## 2024-09-06 NOTE — PROGRESS NOTES
[facility]  Hand and Upper Extremity Service  Follow up visit         Follow up for: Left thumb     Interval History: She returns for her left thumb. She has completed all her therapy and has returned to work as a teacher. She's able to do all her normal activities without pain unless her thumb is bumped. She has improved thumb IP joint motion. She doesn't have any pain.               Past medical history, medications, allergies, surgical history and review of systems are reviewed and otherwise unchanged when compared to last visit on 8/5/24         Examination:  Constitutional: Oriented to person, place, and time.  Appears well-developed and well-nourished.  Head: Normocephalic and atraumatic.  Eyes: Pupils are equal, round, and reactive to light.  Cardiovascular: Intact distal pulses.  Pulmonary/Chest/Breast: Effort normal. No respiratory distress.  Neurological: Alert and oriented to person, place, and time.  Skin: Skin is warm and dry.  Psychiatric: normal mood and affect.  Behavior is normal.  Musculoskeletal: Left hand reveals healed surgical incision along radial aspect of thumb centered at MP joint. MP joint is stiff in fully extended position with no active or passive movement from that position. Excellent CMC joint mobility and demonstrates thumb IP joint range of motion from full extension to about 45 degrees of flexion.       Personal Interpretation of Diagnostic studies: X-rays of left hand taken today demonstrate preservation of joint space at thumb MP joint. No other significant abnormalities.        Impression: Left thumb extension contracture       Plan: While I can't explain why she got such a rigid extension in the first place after somewhat trivial injury, it does suggest that she has some underlying dense scarring problem and I don't think more therapy or releasing the joint will result in any substantial change in symptoms or end result. Given the fact she has  no pain and is using her hand normally, I think no further treatment may be necessary. But if she identifies the position with which her thumb is stuck is impairing her function, she could consider thumb MP joint flexion which will put her thumb into a more functional position for pinch and grasp. At this point, she doesn't want to consider further surgery and she can continue with activities as tolerated. She can follow up as needed.       Follow up: As needed             Juan Kaminski MD  [facility]  Department of Orthopaedic Surgery  Hand and Upper Extremity Reconstruction    Scribe Attestation  By signing my name below, I, Lynn Duff   attest that this documentation has been prepared under the direction and in the presence of Dr. Juan Kaminski.    Dictation performed with the use of voice recognition software.  Syntax and grammatical errors may exist.

## 2024-09-09 ENCOUNTER — HOSPITAL ENCOUNTER (OUTPATIENT)
Dept: RADIOLOGY | Facility: CLINIC | Age: 29
Discharge: HOME | End: 2024-09-09
Payer: COMMERCIAL

## 2024-09-09 ENCOUNTER — APPOINTMENT (OUTPATIENT)
Dept: ORTHOPEDIC SURGERY | Facility: CLINIC | Age: 29
End: 2024-09-09
Payer: COMMERCIAL

## 2024-09-09 VITALS — HEIGHT: 64 IN | WEIGHT: 160 LBS | BODY MASS INDEX: 27.31 KG/M2

## 2024-09-09 DIAGNOSIS — Z02.6 ENCOUNTER RELATED TO WORKER'S COMPENSATION CLAIM: Primary | ICD-10-CM

## 2024-09-09 DIAGNOSIS — S60.222A: ICD-10-CM

## 2024-09-09 PROCEDURE — 73140 X-RAY EXAM OF FINGER(S): CPT | Mod: LT

## 2024-09-09 ASSESSMENT — PAIN - FUNCTIONAL ASSESSMENT: PAIN_FUNCTIONAL_ASSESSMENT: 0-10

## 2024-09-09 ASSESSMENT — PAIN SCALES - GENERAL: PAINLEVEL_OUTOF10: 5 - MODERATE PAIN

## 2024-09-09 ASSESSMENT — PAIN DESCRIPTION - DESCRIPTORS: DESCRIPTORS: ACHING;SORE

## 2025-03-07 ENCOUNTER — APPOINTMENT (OUTPATIENT)
Dept: OTOLARYNGOLOGY | Facility: CLINIC | Age: 30
End: 2025-03-07
Payer: COMMERCIAL

## 2025-08-25 ENCOUNTER — APPOINTMENT (OUTPATIENT)
Dept: PODIATRY | Facility: CLINIC | Age: 30
End: 2025-08-25
Payer: COMMERCIAL

## 2025-08-25 DIAGNOSIS — M72.2 PLANTAR FASCIITIS OF LEFT FOOT: Primary | ICD-10-CM

## 2025-08-25 DIAGNOSIS — M79.672 LEFT FOOT PAIN: ICD-10-CM

## 2025-08-25 PROCEDURE — 99203 OFFICE O/P NEW LOW 30 MIN: CPT | Performed by: PODIATRIST

## 2025-08-25 PROCEDURE — 1036F TOBACCO NON-USER: CPT | Performed by: PODIATRIST

## 2025-08-25 RX ORDER — MELOXICAM 15 MG/1
15 TABLET ORAL DAILY
Qty: 90 TABLET | Refills: 1 | Status: SHIPPED | OUTPATIENT
Start: 2025-08-25 | End: 2026-08-25

## 2025-09-05 ENCOUNTER — APPOINTMENT (OUTPATIENT)
Dept: OTOLARYNGOLOGY | Facility: CLINIC | Age: 30
End: 2025-09-05
Payer: COMMERCIAL

## 2025-09-05 ASSESSMENT — PATIENT HEALTH QUESTIONNAIRE - PHQ9
2. FEELING DOWN, DEPRESSED OR HOPELESS: NOT AT ALL
1. LITTLE INTEREST OR PLEASURE IN DOING THINGS: NOT AT ALL
SUM OF ALL RESPONSES TO PHQ9 QUESTIONS 1 AND 2: 0

## (undated) DEVICE — CORD, CAUTERY, BIOPOLAR FORCEP, 12FT

## (undated) DEVICE — NEEDLE, FILTER, BLUNT, 5 MIC, 18 G X 1.5 IN

## (undated) DEVICE — BLADE, OPHTHALMIC, BEAVER, MINI, SHARP ONE SIDE

## (undated) DEVICE — CUFF, TOURNIQUET, 18 X 4, DUAL PORT/SNGL BLADDER, DISP, LF

## (undated) DEVICE — SLING, ARM, LARGE

## (undated) DEVICE — COVER, MAYO STAND, W/PAD, 23 IN, DISPOSABLE, PLASTIC, LF, STERILE

## (undated) DEVICE — SUTURE, MONOCRYLIC, 4-0, P3, MONO 18

## (undated) DEVICE — GLOVE, SURGICAL, PROTEXIS PI BLUE W/NEUTHERA, 8.0, PF, LF

## (undated) DEVICE — SYRINGE, 10 CC, LUER LOCK

## (undated) DEVICE — COVER, LIGHT HANDLE, SURGICAL, FLEXIBLE, DISPOSABLE, STERILE

## (undated) DEVICE — Device

## (undated) DEVICE — ADHESIVE, SKIN, MASTISOL, 2/3 CC VIAL

## (undated) DEVICE — SUTURE, VICRYL, 2-0, 27 IN, SH, UNDYED

## (undated) DEVICE — SUTURE, VICRYL, 3-0, 27 IN, SH

## (undated) DEVICE — GLOVE, SURGICAL, PROTEXIS PI MICRO, 7.5, PF, LF

## (undated) DEVICE — TOWEL, SURGICAL, NEURO, O/R, 16 X 26, BLUE, STERILE

## (undated) DEVICE — SUTURE, VICRYL PLUS 3-0, SH, 27IN

## (undated) DEVICE — SUTURE, MONOCRYL, 4-0, 18 IN, PS2, UNDYED